# Patient Record
Sex: MALE | Race: WHITE | NOT HISPANIC OR LATINO | Employment: OTHER | URBAN - METROPOLITAN AREA
[De-identification: names, ages, dates, MRNs, and addresses within clinical notes are randomized per-mention and may not be internally consistent; named-entity substitution may affect disease eponyms.]

---

## 2017-06-24 ENCOUNTER — HOSPITAL ENCOUNTER (EMERGENCY)
Facility: MEDICAL CENTER | Age: 57
End: 2017-06-24
Attending: EMERGENCY MEDICINE
Payer: MEDICARE

## 2017-06-24 VITALS
RESPIRATION RATE: 16 BRPM | BODY MASS INDEX: 22.43 KG/M2 | WEIGHT: 190 LBS | TEMPERATURE: 96.6 F | OXYGEN SATURATION: 98 % | DIASTOLIC BLOOD PRESSURE: 72 MMHG | SYSTOLIC BLOOD PRESSURE: 110 MMHG | HEIGHT: 77 IN | HEART RATE: 83 BPM

## 2017-06-24 DIAGNOSIS — M54.42 ACUTE LEFT-SIDED LOW BACK PAIN WITH LEFT-SIDED SCIATICA: ICD-10-CM

## 2017-06-24 PROCEDURE — 96372 THER/PROPH/DIAG INJ SC/IM: CPT

## 2017-06-24 PROCEDURE — 700111 HCHG RX REV CODE 636 W/ 250 OVERRIDE (IP): Performed by: EMERGENCY MEDICINE

## 2017-06-24 PROCEDURE — 99284 EMERGENCY DEPT VISIT MOD MDM: CPT

## 2017-06-24 RX ORDER — CYCLOBENZAPRINE HCL 10 MG
10 TABLET ORAL 3 TIMES DAILY PRN
Qty: 15 TAB | Refills: 1 | Status: SHIPPED | OUTPATIENT
Start: 2017-06-24 | End: 2019-05-24

## 2017-06-24 RX ORDER — KETOROLAC TROMETHAMINE 30 MG/ML
30 INJECTION, SOLUTION INTRAMUSCULAR; INTRAVENOUS ONCE
Status: COMPLETED | OUTPATIENT
Start: 2017-06-24 | End: 2017-06-24

## 2017-06-24 RX ORDER — ONDANSETRON 2 MG/ML
4 INJECTION INTRAMUSCULAR; INTRAVENOUS ONCE
Status: COMPLETED | OUTPATIENT
Start: 2017-06-24 | End: 2017-06-24

## 2017-06-24 RX ORDER — METHYLPREDNISOLONE 4 MG/1
TABLET ORAL
Qty: 1 KIT | Refills: 0 | Status: SHIPPED | OUTPATIENT
Start: 2017-06-24 | End: 2019-05-24

## 2017-06-24 RX ORDER — OXYCODONE AND ACETAMINOPHEN 10; 325 MG/1; MG/1
1 TABLET ORAL EVERY 6 HOURS PRN
Qty: 20 TAB | Refills: 0 | Status: SHIPPED | OUTPATIENT
Start: 2017-06-24 | End: 2019-05-24

## 2017-06-24 RX ADMIN — HYDROMORPHONE HYDROCHLORIDE 1 MG: 1 INJECTION, SOLUTION INTRAMUSCULAR; INTRAVENOUS; SUBCUTANEOUS at 18:42

## 2017-06-24 RX ADMIN — ONDANSETRON 4 MG: 2 INJECTION INTRAMUSCULAR; INTRAVENOUS at 18:42

## 2017-06-24 RX ADMIN — KETOROLAC TROMETHAMINE 30 MG: 30 INJECTION, SOLUTION INTRAMUSCULAR at 18:42

## 2017-06-24 ASSESSMENT — PAIN SCALES - WONG BAKER: WONGBAKER_NUMERICALRESPONSE: HURTS AS MUCH AS POSSIBLE

## 2017-06-24 ASSESSMENT — PAIN SCALES - GENERAL
PAINLEVEL_OUTOF10: 3
PAINLEVEL_OUTOF10: 9

## 2017-06-24 NOTE — ED AVS SNAPSHOT
Home Care Instructions                                                                                                                Jerome Sterling   MRN: [id number]    Department:  [facility], Emergency Dept   Date of Visit:  6/24/2017            [facility], Emergency Dept    [address]    [place] [address]    Phone:  [phone]      You were seen by     Francisco Javier Arzola M.D.      Your Diagnosis Was     Acute left-sided low back pain with left-sided sciatica     M54.42       These are the medications you received during your hospitalization from 06/24/2017 1645 to 06/24/2017 1918     Date/Time Order Dose Route Action    06/24/2017 1842 HYDROmorphone (DILAUDID) injection 1 mg 1 mg Intramuscular Given    06/24/2017 1842 ketorolac (TORADOL) injection 30 mg 30 mg Intramuscular Given    06/24/2017 1842 ondansetron (ZOFRAN) syringe/vial injection 4 mg 4 mg Intramuscular Given      Follow-up Information     1. Schedule an appointment as soon as possible for a visit with Олег Yu M.D..    Specialty:  Neurosurgery    Contact information    [address] [address] [phone]          2. Schedule an appointment as soon as possible for a visit with Familia Zaidi D.O..    Specialty:  Family Medicine    Why:  return if worse or for any concerns    Contact information    [address]   [address]  [facility] [address] [phone]        Medication Information     Review all of your home medications and newly ordered medications with your primary doctor and/or pharmacist as soon as possible. Follow medication instructions as directed by your doctor and/or pharmacist.     Please keep your complete medication list with you and share with your physician. Update the information when medications are discontinued, doses are changed, or new medications (including over-the-counter products) are added; and carry medication information at all times in the  event of emergency situations.               Medication List      START taking these medications        Instructions    Morning Afternoon Evening Bedtime    cyclobenzaprine 10 MG Tabs   Commonly known as:  FLEXERIL        Take 1 Tab by mouth 3 times a day as needed for Muscle Spasms.   Dose:  10 mg                        MethylPREDNISolone 4 MG Tbpk   Commonly known as:  MEDROL DOSEPAK        Take as directed                        oxycodone-acetaminophen  MG Tabs   Commonly known as:  PERCOCET-10        Take 1 Tab by mouth every 6 hours as needed for Moderate Pain or Severe Pain.   Dose:  1 Tab                          ASK your doctor about these medications        Instructions    Morning Afternoon Evening Bedtime    gabapentin 300 MG Caps   Commonly known as:  NEURONTIN        Take 300 mg by mouth every evening.   Dose:  300 mg                        GLUCOSAMINE-CHONDROITIN-MSM PO        Take 2 Tabs by mouth every day.   Dose:  2 Tab                        morphine ER 15 MG Tbcr tablet   Commonly known as:  MS CONTIN        Take 15 mg by mouth every morning.   Dose:  15 mg                        NORCO  MG Tabs   Generic drug:  hydrocodone/acetaminophen        Take 1-2 Tabs by mouth 4 times a day.   Dose:  1-2 Tab                        trazodone 50 MG Tabs   Commonly known as:  DESYREL        Take 50 mg by mouth every evening.   Dose:  50 mg                        WELLBUTRIN  MG Tb12 sustained-release tablet   Generic drug:  buPROPion SR        Take 150 mg by mouth every morning.   Dose:  150 mg                             Where to Get Your Medications      You can get these medications from any pharmacy     Bring a paper prescription for each of these medications    - cyclobenzaprine 10 MG Tabs  - MethylPREDNISolone 4 MG Tbpk  - oxycodone-acetaminophen  MG Tabs              Discharge Instructions       Sciatica  Sciatica is pain, weakness, numbness, or tingling along the path of the  sciatic nerve. The nerve starts in the lower back and runs down the back of each leg. The nerve controls the muscles in the lower leg and in the back of the knee, while also providing sensation to the back of the thigh, lower leg, and the sole of your foot. Sciatica is a symptom of another medical condition. For instance, nerve damage or certain conditions, such as a herniated disk or bone spur on the spine, pinch or put pressure on the sciatic nerve. This causes the pain, weakness, or other sensations normally associated with sciatica. Generally, sciatica only affects one side of the body.  CAUSES   · Herniated or slipped disc.  · Degenerative disk disease.  · A pain disorder involving the narrow muscle in the buttocks (piriformis syndrome).  · Pelvic injury or fracture.  · Pregnancy.  · Tumor (rare).  SYMPTOMS   Symptoms can vary from mild to very severe. The symptoms usually travel from the low back to the buttocks and down the back of the leg. Symptoms can include:  · Mild tingling or dull aches in the lower back, leg, or hip.  · Numbness in the back of the calf or sole of the foot.  · Burning sensations in the lower back, leg, or hip.  · Sharp pains in the lower back, leg, or hip.  · Leg weakness.  · Severe back pain inhibiting movement.  These symptoms may get worse with coughing, sneezing, laughing, or prolonged sitting or standing. Also, being overweight may worsen symptoms.  DIAGNOSIS   Your caregiver will perform a physical exam to look for common symptoms of sciatica. He or she may ask you to do certain movements or activities that would trigger sciatic nerve pain. Other tests may be performed to find the cause of the sciatica. These may include:  · Blood tests.  · X-rays.  · Imaging tests, such as an MRI or CT scan.  TREATMENT   Treatment is directed at the cause of the sciatic pain. Sometimes, treatment is not necessary and the pain and discomfort goes away on its own. If treatment is needed, your  caregiver may suggest:  · Over-the-counter medicines to relieve pain.  · Prescription medicines, such as anti-inflammatory medicine, muscle relaxants, or narcotics.  · Applying heat or ice to the painful area.  · Steroid injections to lessen pain, irritation, and inflammation around the nerve.  · Reducing activity during periods of pain.  · Exercising and stretching to strengthen your abdomen and improve flexibility of your spine. Your caregiver may suggest losing weight if the extra weight makes the back pain worse.  · Physical therapy.  · Surgery to eliminate what is pressing or pinching the nerve, such as a bone spur or part of a herniated disk.  HOME CARE INSTRUCTIONS   · Only take over-the-counter or prescription medicines for pain or discomfort as directed by your caregiver.  · Apply ice to the affected area for 20 minutes, 3-4 times a day for the first 48-72 hours. Then try heat in the same way.  · Exercise, stretch, or perform your usual activities if these do not aggravate your pain.  · Attend physical therapy sessions as directed by your caregiver.  · Keep all follow-up appointments as directed by your caregiver.  · Do not wear high heels or shoes that do not provide proper support.  · Check your mattress to see if it is too soft. A firm mattress may lessen your pain and discomfort.  SEEK IMMEDIATE MEDICAL CARE IF:   · You lose control of your bowel or bladder (incontinence).  · You have increasing weakness in the lower back, pelvis, buttocks, or legs.  · You have redness or swelling of your back.  · You have a burning sensation when you urinate.  · You have pain that gets worse when you lie down or awakens you at night.  · Your pain is worse than you have experienced in the past.  · Your pain is lasting longer than 4 weeks.  · You are suddenly losing weight without reason.  MAKE SURE YOU:  · Understand these instructions.  · Will watch your condition.  · Will get help right away if you are not doing well  or get worse.     This information is not intended to replace advice given to you by your health care provider. Make sure you discuss any questions you have with your health care provider.     Document Released: 12/12/2002 Document Revised: 06/18/2013 Document Reviewed: 04/28/2013  [employer] Interactive Patient Education ©2016 [employer]  Back Pain, Adult  Back pain is very common in adults. The cause of back pain is rarely dangerous and the pain often gets better over time. The cause of your back pain may not be known. Some common causes of back pain include:  · Strain of the muscles or ligaments supporting the spine.  · Wear and tear (degeneration) of the spinal disks.  · Arthritis.  · Direct injury to the back.  For many people, back pain may return. Since back pain is rarely dangerous, most people can learn to manage this condition on their own.  HOME CARE INSTRUCTIONS  Watch your back pain for any changes. The following actions may help to lessen any discomfort you are feeling:  · Remain active. It is stressful on your back to sit or  one place for long periods of time. Do not sit, drive, or  one place for more than 30 minutes at a time. Take short walks on even surfaces as soon as you are able. Try to increase the length of time you walk each day.  · Exercise regularly as directed by your health care provider. Exercise helps your back heal faster. It also helps avoid future injury by keeping your muscles strong and flexible.  · Do not stay in bed. Resting more than 1-2 days can delay your recovery.  · Pay attention to your body when you bend and lift. The most comfortable positions are those that put less stress on your recovering back. Always use proper lifting techniques, including:  ¨ Bending your knees.  ¨ Keeping the load close to your body.  ¨ Avoiding twisting.  · Find a comfortable position to sleep. Use a firm mattress and lie on your side with your knees slightly bent. If you lie on  your back, put a pillow under your knees.  · Avoid feeling anxious or stressed. Stress increases muscle tension and can worsen back pain. It is important to recognize when you are anxious or stressed and learn ways to manage it, such as with exercise.  · Take medicines only as directed by your health care provider. Over-the-counter medicines to reduce pain and inflammation are often the most helpful. Your health care provider may prescribe muscle relaxant drugs. These medicines help dull your pain so you can more quickly return to your normal activities and healthy exercise.  · Apply ice to the injured area:  ¨ Put ice in a plastic bag.  ¨ Place a towel between your skin and the bag.  ¨ Leave the ice on for 20 minutes, 2-3 times a day for the first 2-3 days. After that, ice and heat may be alternated to reduce pain and spasms.  · Maintain a healthy weight. Excess weight puts extra stress on your back and makes it difficult to maintain good posture.  SEEK MEDICAL CARE IF:  · You have pain that is not relieved with rest or medicine.  · You have increasing pain going down into the legs or buttocks.  · You have pain that does not improve in one week.  · You have night pain.  · You lose weight.  · You have a fever or chills.  SEEK IMMEDIATE MEDICAL CARE IF:   · You develop new bowel or bladder control problems.  · You have unusual weakness or numbness in your arms or legs.  · You develop nausea or vomiting.  · You develop abdominal pain.  · You feel faint.     This information is not intended to replace advice given to you by your health care provider. Make sure you discuss any questions you have with your health care provider.     Document Released: 12/18/2006 Document Revised: 01/08/2016 Document Reviewed: 04/21/2015  [employer] Interactive Patient Education ©2016 [employer]            Patient Information     Patient Information    Following emergency treatment: all patient requiring follow-up care must return either  to a private physician or a clinic if your condition worsens before you are able to obtain further medical attention, please return to the emergency room.     Billing Information    At [facility], we work to make the billing process streamlined for our patients.  Our Representatives are here to answer any questions you may have regarding your hospital bill.  If you have insurance coverage and have supplied your insurance information to us, we will submit a claim to your insurer on your behalf.  Should you have any questions regarding your bill, we can be reached online or by phone as follows:  Online: You are able pay your bills online or live chat with our representatives about any billing questions you may have. We are here to help Monday - Friday from 8:00am to 7:30pm and 9:00am - 12:00pm on Saturdays.  Please visit [email or web address]  for more information.   Phone:  [phone] or [phone]    Please note that your emergency physician, surgeon, pathologist, radiologist, anesthesiologist, and other specialists are not employed by [facility] and will therefore bill separately for their services.  Please contact them directly for any questions concerning their bills at the numbers below:     Emergency Physician Services:  [phone]  [facility]:  [phone]  [facility]:  [phone]  [facility]:  [phone]    1. Your final bill may vary from the amount quoted upon discharge if all procedures are not complete at that time, or if your doctor has additional procedures of which we are not aware. You will receive an additional bill if you return to the Emergency Department at [facility] for suture removal regardless of the facility of which the sutures were placed.     2. Please arrange for settlement of this account at the emergency registration.    3. All self-pay accounts are due in full at the time of treatment.   If you are unable to meet this obligation then payment is expected within 4-5 days.     4. If you have had radiology studies (CT, X-ray, Ultrasound, MRI), you have received a preliminary result during your emergency department visit. Please contact the radiology department [phone] to receive a copy of your final result. Please discuss the Final result with your primary physician or with the follow up physician provided.     Crisis Hotline:  [place] Crisis Hotline:  2-368-JXWGOHJ or 1-395.546.8897  [place] Crisis Hotline:    1-744.917.2001 or 851-358-5790         ED Discharge Follow Up Questions    1. In order to provide you with very good care, we would like to follow up with a phone call in the next few days.  May we have your permission to contact you?     YES /  NO    2. What is the best phone number to call you? (       )_____-__________    3. What is the best time to call you?      Morning  /  Afternoon  /  Evening                   Patient Signature:  ____________________________________________________________    Date:  ____________________________________________________________

## 2017-06-24 NOTE — ED AVS SNAPSHOT
Advaction Access Code: Activation code not generated  Current Advaction Status: Active    [employer]  A secure, online tool to manage your health information     [employer]’s Advaction® is a secure, online tool that connects you to your personalized health information from the privacy of your home -- day or night - making it very easy for you to manage your healthcare. Once the activation process is completed, you can even access your medical information using the Advaction irene, which is available for free in the Apple Irene store or Google Play store.     Advaction provides the following levels of access (as shown below):   My Chart Features   [facility] Primary Care Doctor [facility]  Specialists [facility]  Urgent  Care Non-[facility]  Primary Care  Doctor   Email your healthcare team securely and privately 24/7 X X X X   Manage appointments: schedule your next appointment; view details of past/upcoming appointments X      Request prescription refills. X      View recent personal medical records, including lab and immunizations X X X X   View health record, including health history, allergies, medications X X X X   Read reports about your outpatient visits, procedures, consult and ER notes X X X X   See your discharge summary, which is a recap of your hospital and/or ER visit that includes your diagnosis, lab results, and care plan. X X       How to register for Advaction:  1. Go to  [email or web address].  2. Click on the Sign Up Now box, which takes you to the New Member Sign Up page. You will need to provide the following information:  a. Enter your Advaction Access Code exactly as it appears at the top of this page. (You will not need to use this code after you’ve completed the sign-up process. If you do not sign up before the expiration date, you must request a new code.)   b. Enter your date of birth.   c. Enter your home email address.   d. Click Submit, and follow the next screen’s instructions.  3. Create a Advaction ID. This will  be your [employer] login ID and cannot be changed, so think of one that is secure and easy to remember.  4. Create a Com[employer] Serve password. You can change your password at any time.  5. Enter your Password Reset Question and Answer. This can be used at a later time if you forget your password.   6. Enter your e-mail address. This allows you to receive e-mail notifications when new information is available in Comply Serve.  7. Click Sign Up. You can now view your health information.    For assistance activating your Comply Serve account, call [phone]

## 2017-06-24 NOTE — ED AVS SNAPSHOT
6/24/2017    Jerome Crews [place]  [address] [place] [address]    Dear Jerome:    [facility] wants to ensure your discharge home is safe and you or your loved ones have had all of your questions answered regarding your care after you leave the hospital.    Below is a list of resources and contact information should you have any questions regarding your hospital stay, follow-up instructions, or active medical symptoms.    Questions or Concerns Regarding… Contact   Medical Questions Related to Your Discharge  (7 days a week, 8am-5pm) Contact a Nurse Care Coordinator   [phone]   Medical Questions Not Related to Your Discharge  (24 hours a day / 7 days a week)  Contact the Nurse Health Line   [phone]    Medications or Discharge Instructions Refer to your discharge packet   or contact your [facility] Primary Care Provider   [phone]   Follow-up Appointment(s) Schedule your appointment via Payment plugin   or contact Scheduling [phone]   Billing Review your statement via Payment plugin  or contact Billing [phone]   Medical Records Review your records via Payment plugin   or contact Medical Records [phone]     You may receive a telephone call within two days of discharge. This call is to make certain you understand your discharge instructions and have the opportunity to have any questions answered. You can also easily access your medical information, test results and upcoming appointments via the Payment plugin free online health management tool. You can learn more and sign up at [employer]/Payment plugin. For assistance setting up your Payment plugin account, please call [phone].    Once again, we want to ensure your discharge home is safe and that you have a clear understanding of any next steps in your care. If you have any questions or concerns, please do not hesitate to contact us, we are here for you. Thank you for choosing [facility] for your healthcare needs.    Sincerely,    Your [facility] Healthcare Team

## 2017-06-25 NOTE — ED NOTES
"Pt bib ems c/o back pain after walking and hearing \"a pop\" now with low back pain and pain radiating down left leg. Pt has hx of back surgeries.   "

## 2017-06-25 NOTE — ED NOTES
"Pt very upset that he was asked to get out of wheelchair and onto gurney, pt states \"my lower back hurts and it's more comfortable sitting right where I am\", explained that doctor cannot exam pt while he is in the wheelchair, pt states \"I know what's wrong with me and I can tell him exactly where it popped out of place\", explained to pt that the doctor will not see pt until he is on gurney, pt grumbling stating \"fine, but it's gonna take awhile before I can get up there, if ya just give me some pain medication it would make it a lot easier on me\", explained to pt that only the doctor can order medication.  "

## 2017-06-25 NOTE — ED PROVIDER NOTES
ED Provider Note    CHIEF COMPLAINT  Chief Complaint   Patient presents with   • Low Back Pain       HPI  Jerome Sterling is a 56 y.o. male who presents with low back pain, left paraspinal region radiating down the left leg.  Patient had previous surgery in 2014, is planning to follow up with Dr. espinal.  I walking today, he felt his right ankle beginning to twist, states he jumped back to his left leg to prevent his right leg from being sprained feeling a pop in his back.  There was no fall or direct blow to his back.  Pain has been constant, cramping, at times sharp radiating down his left leg.  He has had previous sciatica and states this feels similar.The patient denies bowel or bladder incontinence.  The patient denies urinary retension or saddle paresthesias.  There is no abdominal pain or fever.  There is no history of injected street drugs or recent surgery.  No acute numbness or weakness    REVIEW OF SYSTEMS    Constitutional: No fever  Respiratory: No shortness of breath  Cardiac: No chest pain or syncope  Gastrointestinal: No abdominal pain  Musculoskeletal: Low back pain  Neurologic: Left leg sciatica  Skin: no shingles           PAST MEDICAL HISTORY  Past Medical History   Diagnosis Date   • Pericardial cyst 9/25/2012   • Hypercholesterolemia 9/25/2012   • Snoring    • Jaundice 1979   • Unspecified cataract      early stages   • Arthritis      back and neck   • Psychiatric problem      depression  not on meds   • Hepatitis A      1979   • Dental disorder      denture/partial   • Sleep apnea      CPAP   • Heart burn    • Pain      back and neck       FAMILY HISTORY  Family History   Problem Relation Age of Onset   • Heart Disease Mother    • Other Mother        SOCIAL HISTORY  Social History     Social History   • Marital Status:      Spouse Name: N/A   • Number of Children: N/A   • Years of Education: N/A     Social History Main Topics   • Smoking status: Current Every Day Smoker -- 2.00  "packs/day for 40 years     Types: Cigars   • Smokeless tobacco: Former User     Types: Chew   • Alcohol Use: 3.0 oz/week     6 Cans of beer per week      Comment: 6 pack a wk   • Drug Use: No      Comment: former, none since 2005   • Sexual Activity: Not on file     Other Topics Concern   • Not on file     Social History Narrative   • No narrative on file       SURGICAL HISTORY  Past Surgical History   Procedure Laterality Date   • Lumbar laminectomy diskectomy     • Appendectomy     • Other orthopedic surgery  1990     left forearm ligaments   • Cervical disk and fusion anterior  6/30/2014     Performed by Олег Yu M.D. at SUR[facility]ERY [facility]   • Lumbar fusion anterior  10/14/2014     Performed by Олег Yu M.D. at SUR[facility]ERY [facility]   • Lumbar laminectomy diskectomy  10/14/2014     Performed by Олег Yu M.D. at SUR[facility]ERY [facility]       CURRENT MEDICATIONS  Home Medications     **Home medications have not yet been reviewed for this encounter**          ALLERGIES  No Known Allergies    PHYSICAL EXAM  VITAL SIGNS: /78 mmHg  Pulse 88  Temp(Src) 35.9 °C (96.6 °F) (Temporal)  Resp 18  Ht 1.956 m (6' 5\")  Wt 86.183 kg (190 lb)  BMI 22.53 kg/m2  SpO2 98%  Constitutional:  No acute distress, Non-toxic appearance.   Skin: Warm, No shingles.   Musculoskeletal: Tenderness lumbar left paraspinal muscle tone.  No midline tenderness.  No left leg tenderness  Vascular: Normal capillary refill in the lower extremity  Neurologic: Sensation intact both feet.  Strength intact with exception of left leg slight weakness secondary to pain with movement.       COURSE & MEDICAL DECISION MAKING  Pertinent Labs & Imaging studies reviewed. (See chart for details)  Patient felt somewhat improved after Dilaudid injection, was able to stand and hold his own waste.  Review of  database shows no pattern of narcotic abuse, showing no prescriptions over the past 2 years.  Patient placed on Medrol " Dosepak, Flexeril for muscle spasm, Percocet for pain.  Patient states he will be following up with his neurosurgeon Dr. espinal as soon as possible.  He has agreed to return if worse or for any concerns.  There is no evidence of cauda equina syndrome on exam or by history.    FINAL IMPRESSION  1. Acute left-sided low back pain with left-sided sciatica            Electronically signed by: Francisco Javier Arzola, 6/24/2017 6:34 PM

## 2017-06-25 NOTE — DISCHARGE INSTRUCTIONS
Sciatica  Sciatica is pain, weakness, numbness, or tingling along the path of the sciatic nerve. The nerve starts in the lower back and runs down the back of each leg. The nerve controls the muscles in the lower leg and in the back of the knee, while also providing sensation to the back of the thigh, lower leg, and the sole of your foot. Sciatica is a symptom of another medical condition. For instance, nerve damage or certain conditions, such as a herniated disk or bone spur on the spine, pinch or put pressure on the sciatic nerve. This causes the pain, weakness, or other sensations normally associated with sciatica. Generally, sciatica only affects one side of the body.  CAUSES   · Herniated or slipped disc.  · Degenerative disk disease.  · A pain disorder involving the narrow muscle in the buttocks (piriformis syndrome).  · Pelvic injury or fracture.  · Pregnancy.  · Tumor (rare).  SYMPTOMS   Symptoms can vary from mild to very severe. The symptoms usually travel from the low back to the buttocks and down the back of the leg. Symptoms can include:  · Mild tingling or dull aches in the lower back, leg, or hip.  · Numbness in the back of the calf or sole of the foot.  · Burning sensations in the lower back, leg, or hip.  · Sharp pains in the lower back, leg, or hip.  · Leg weakness.  · Severe back pain inhibiting movement.  These symptoms may get worse with coughing, sneezing, laughing, or prolonged sitting or standing. Also, being overweight may worsen symptoms.  DIAGNOSIS   Your caregiver will perform a physical exam to look for common symptoms of sciatica. He or she may ask you to do certain movements or activities that would trigger sciatic nerve pain. Other tests may be performed to find the cause of the sciatica. These may include:  · Blood tests.  · X-rays.  · Imaging tests, such as an MRI or CT scan.  TREATMENT   Treatment is directed at the cause of the sciatic pain. Sometimes, treatment is not necessary  and the pain and discomfort goes away on its own. If treatment is needed, your caregiver may suggest:  · Over-the-counter medicines to relieve pain.  · Prescription medicines, such as anti-inflammatory medicine, muscle relaxants, or narcotics.  · Applying heat or ice to the painful area.  · Steroid injections to lessen pain, irritation, and inflammation around the nerve.  · Reducing activity during periods of pain.  · Exercising and stretching to strengthen your abdomen and improve flexibility of your spine. Your caregiver may suggest losing weight if the extra weight makes the back pain worse.  · Physical therapy.  · Surgery to eliminate what is pressing or pinching the nerve, such as a bone spur or part of a herniated disk.  HOME CARE INSTRUCTIONS   · Only take over-the-counter or prescription medicines for pain or discomfort as directed by your caregiver.  · Apply ice to the affected area for 20 minutes, 3-4 times a day for the first 48-72 hours. Then try heat in the same way.  · Exercise, stretch, or perform your usual activities if these do not aggravate your pain.  · Attend physical therapy sessions as directed by your caregiver.  · Keep all follow-up appointments as directed by your caregiver.  · Do not wear high heels or shoes that do not provide proper support.  · Check your mattress to see if it is too soft. A firm mattress may lessen your pain and discomfort.  SEEK IMMEDIATE MEDICAL CARE IF:   · You lose control of your bowel or bladder (incontinence).  · You have increasing weakness in the lower back, pelvis, buttocks, or legs.  · You have redness or swelling of your back.  · You have a burning sensation when you urinate.  · You have pain that gets worse when you lie down or awakens you at night.  · Your pain is worse than you have experienced in the past.  · Your pain is lasting longer than 4 weeks.  · You are suddenly losing weight without reason.  MAKE SURE YOU:  · Understand these  instructions.  · Will watch your condition.  · Will get help right away if you are not doing well or get worse.     This information is not intended to replace advice given to you by your health care provider. Make sure you discuss any questions you have with your health care provider.     Document Released: 12/12/2002 Document Revised: 06/18/2013 Document Reviewed: 04/28/2013  [employer] Interactive Patient Education ©2016 [employer]  Back Pain, Adult  Back pain is very common in adults. The cause of back pain is rarely dangerous and the pain often gets better over time. The cause of your back pain may not be known. Some common causes of back pain include:  · Strain of the muscles or ligaments supporting the spine.  · Wear and tear (degeneration) of the spinal disks.  · Arthritis.  · Direct injury to the back.  For many people, back pain may return. Since back pain is rarely dangerous, most people can learn to manage this condition on their own.  HOME CARE INSTRUCTIONS  Watch your back pain for any changes. The following actions may help to lessen any discomfort you are feeling:  · Remain active. It is stressful on your back to sit or  one place for long periods of time. Do not sit, drive, or  one place for more than 30 minutes at a time. Take short walks on even surfaces as soon as you are able. Try to increase the length of time you walk each day.  · Exercise regularly as directed by your health care provider. Exercise helps your back heal faster. It also helps avoid future injury by keeping your muscles strong and flexible.  · Do not stay in bed. Resting more than 1-2 days can delay your recovery.  · Pay attention to your body when you bend and lift. The most comfortable positions are those that put less stress on your recovering back. Always use proper lifting techniques, including:  ¨ Bending your knees.  ¨ Keeping the load close to your body.  ¨ Avoiding twisting.  · Find a comfortable  position to sleep. Use a firm mattress and lie on your side with your knees slightly bent. If you lie on your back, put a pillow under your knees.  · Avoid feeling anxious or stressed. Stress increases muscle tension and can worsen back pain. It is important to recognize when you are anxious or stressed and learn ways to manage it, such as with exercise.  · Take medicines only as directed by your health care provider. Over-the-counter medicines to reduce pain and inflammation are often the most helpful. Your health care provider may prescribe muscle relaxant drugs. These medicines help dull your pain so you can more quickly return to your normal activities and healthy exercise.  · Apply ice to the injured area:  ¨ Put ice in a plastic bag.  ¨ Place a towel between your skin and the bag.  ¨ Leave the ice on for 20 minutes, 2-3 times a day for the first 2-3 days. After that, ice and heat may be alternated to reduce pain and spasms.  · Maintain a healthy weight. Excess weight puts extra stress on your back and makes it difficult to maintain good posture.  SEEK MEDICAL CARE IF:  · You have pain that is not relieved with rest or medicine.  · You have increasing pain going down into the legs or buttocks.  · You have pain that does not improve in one week.  · You have night pain.  · You lose weight.  · You have a fever or chills.  SEEK IMMEDIATE MEDICAL CARE IF:   · You develop new bowel or bladder control problems.  · You have unusual weakness or numbness in your arms or legs.  · You develop nausea or vomiting.  · You develop abdominal pain.  · You feel faint.     This information is not intended to replace advice given to you by your health care provider. Make sure you discuss any questions you have with your health care provider.     Document Released: 12/18/2006 Document Revised: 01/08/2016 Document Reviewed: 04/21/2015  [employer] Interactive Patient Education ©2016 [employer]

## 2019-04-09 ENCOUNTER — TELEPHONE (OUTPATIENT)
Dept: SCHEDULING | Facility: IMAGING CENTER | Age: 59
End: 2019-04-09

## 2019-05-23 ENCOUNTER — TELEPHONE (OUTPATIENT)
Dept: MEDICAL GROUP | Facility: MEDICAL CENTER | Age: 59
End: 2019-05-23

## 2019-05-23 NOTE — TELEPHONE ENCOUNTER
NEW PATIENT VISIT PRE-VISIT PLANNING    1.  EpicCare Patient is checked in Patient Demographics? YES    2.  Immunizations were updated in Epic using WebIZ?: Epic matches WebIZ       •  Web Iz Recommendations: FLU, MMR , TDAP, VARICELLA (Chicken Pox)  and SHINGRIX (Shingles)    3.  Is this appointment scheduled as a Hospital Follow-Up? No    4.  Patient is due for the following Health Maintenance Topics:   Health Maintenance Due   Topic Date Due   • Annual Wellness Visit  1960   • IMM DTaP/Tdap/Td Vaccine (1 - Tdap) 12/27/1977   • COLONOSCOPY  12/27/2008   • IMM ZOSTER VACCINES (1 of 2) 12/27/2008     5. Orders for overdue Health Maintenance topics pended in Pre-Charting? NO    6.  Reviewed/Updated the following with patient:   •   Preferred Pharmacy? NO       •   Preferred Lab? NO       •   Preferred Communication? NO       •   Allergies? NO       •   Medications? NO       •   Social History? NO       •   Family History (document living status of immediate family members and if + hx of cancer, diabetes, hypertension, hyperlipidemia, heart attack, stroke) NO    7.  Updated Care Team?       •   DME Company (gait device, O2, CPAP, etc.) NO       •   Other Specialists (eye doctor, derm, GYN, cardiology, endo, etc): NO    8.  Patient was informed to arrive 15 min prior to their   scheduled appointment and bring in their medication bottles.

## 2019-05-24 ENCOUNTER — OFFICE VISIT (OUTPATIENT)
Dept: MEDICAL GROUP | Facility: MEDICAL CENTER | Age: 59
End: 2019-05-24
Payer: MEDICARE

## 2019-05-24 ENCOUNTER — HOSPITAL ENCOUNTER (OUTPATIENT)
Dept: LAB | Facility: MEDICAL CENTER | Age: 59
End: 2019-05-24
Attending: PHYSICIAN ASSISTANT
Payer: MEDICARE

## 2019-05-24 VITALS
BODY MASS INDEX: 24.99 KG/M2 | TEMPERATURE: 98.2 F | WEIGHT: 211.64 LBS | SYSTOLIC BLOOD PRESSURE: 110 MMHG | HEIGHT: 77 IN | OXYGEN SATURATION: 95 % | DIASTOLIC BLOOD PRESSURE: 74 MMHG | HEART RATE: 64 BPM | RESPIRATION RATE: 16 BRPM

## 2019-05-24 DIAGNOSIS — M79.671 PAIN IN BOTH FEET: ICD-10-CM

## 2019-05-24 DIAGNOSIS — F17.200 TOBACCO DEPENDENCE: ICD-10-CM

## 2019-05-24 DIAGNOSIS — R19.7 DIARRHEA, UNSPECIFIED TYPE: ICD-10-CM

## 2019-05-24 DIAGNOSIS — E78.00 HYPERCHOLESTEREMIA: ICD-10-CM

## 2019-05-24 DIAGNOSIS — M79.672 PAIN IN BOTH FEET: ICD-10-CM

## 2019-05-24 DIAGNOSIS — K62.5 RECTAL BLEEDING: ICD-10-CM

## 2019-05-24 DIAGNOSIS — Z76.89 ENCOUNTER TO ESTABLISH CARE: ICD-10-CM

## 2019-05-24 DIAGNOSIS — Z00.00 PREVENTATIVE HEALTH CARE: ICD-10-CM

## 2019-05-24 DIAGNOSIS — G89.29 CHRONIC BILATERAL LOW BACK PAIN WITH BILATERAL SCIATICA: ICD-10-CM

## 2019-05-24 DIAGNOSIS — R73.03 PREDIABETES: ICD-10-CM

## 2019-05-24 DIAGNOSIS — Z12.5 SCREENING PSA (PROSTATE SPECIFIC ANTIGEN): ICD-10-CM

## 2019-05-24 DIAGNOSIS — G47.30 SLEEP APNEA, UNSPECIFIED TYPE: ICD-10-CM

## 2019-05-24 DIAGNOSIS — M54.42 CHRONIC BILATERAL LOW BACK PAIN WITH BILATERAL SCIATICA: ICD-10-CM

## 2019-05-24 DIAGNOSIS — M54.41 CHRONIC BILATERAL LOW BACK PAIN WITH BILATERAL SCIATICA: ICD-10-CM

## 2019-05-24 LAB
ALBUMIN SERPL BCP-MCNC: 4.4 G/DL (ref 3.2–4.9)
ALBUMIN/GLOB SERPL: 1.6 G/DL
ALP SERPL-CCNC: 94 U/L (ref 30–99)
ALT SERPL-CCNC: 25 U/L (ref 2–50)
ANION GAP SERPL CALC-SCNC: 3 MMOL/L (ref 0–11.9)
AST SERPL-CCNC: 24 U/L (ref 12–45)
BASOPHILS # BLD AUTO: 0.6 % (ref 0–1.8)
BASOPHILS # BLD: 0.03 K/UL (ref 0–0.12)
BILIRUB SERPL-MCNC: 0.6 MG/DL (ref 0.1–1.5)
BUN SERPL-MCNC: 17 MG/DL (ref 8–22)
CALCIUM SERPL-MCNC: 9.1 MG/DL (ref 8.5–10.5)
CHLORIDE SERPL-SCNC: 108 MMOL/L (ref 96–112)
CHOLEST SERPL-MCNC: 177 MG/DL (ref 100–199)
CO2 SERPL-SCNC: 26 MMOL/L (ref 20–33)
CREAT SERPL-MCNC: 1.04 MG/DL (ref 0.5–1.4)
EOSINOPHIL # BLD AUTO: 0.12 K/UL (ref 0–0.51)
EOSINOPHIL NFR BLD: 2.6 % (ref 0–6.9)
ERYTHROCYTE [DISTWIDTH] IN BLOOD BY AUTOMATED COUNT: 45.2 FL (ref 35.9–50)
EST. AVERAGE GLUCOSE BLD GHB EST-MCNC: 105 MG/DL
FASTING STATUS PATIENT QL REPORTED: NORMAL
GLOBULIN SER CALC-MCNC: 2.7 G/DL (ref 1.9–3.5)
GLUCOSE SERPL-MCNC: 112 MG/DL (ref 65–99)
HBA1C MFR BLD: 5.3 % (ref 0–5.6)
HCT VFR BLD AUTO: 46.7 % (ref 42–52)
HDLC SERPL-MCNC: 52 MG/DL
HGB BLD-MCNC: 15.7 G/DL (ref 14–18)
IMM GRANULOCYTES # BLD AUTO: 0.01 K/UL (ref 0–0.11)
IMM GRANULOCYTES NFR BLD AUTO: 0.2 % (ref 0–0.9)
LDLC SERPL CALC-MCNC: 93 MG/DL
LYMPHOCYTES # BLD AUTO: 1.58 K/UL (ref 1–4.8)
LYMPHOCYTES NFR BLD: 33.7 % (ref 22–41)
MCH RBC QN AUTO: 31.5 PG (ref 27–33)
MCHC RBC AUTO-ENTMCNC: 33.6 G/DL (ref 33.7–35.3)
MCV RBC AUTO: 93.8 FL (ref 81.4–97.8)
MONOCYTES # BLD AUTO: 0.41 K/UL (ref 0–0.85)
MONOCYTES NFR BLD AUTO: 8.7 % (ref 0–13.4)
NEUTROPHILS # BLD AUTO: 2.54 K/UL (ref 1.82–7.42)
NEUTROPHILS NFR BLD: 54.2 % (ref 44–72)
NRBC # BLD AUTO: 0 K/UL
NRBC BLD-RTO: 0 /100 WBC
PLATELET # BLD AUTO: 193 K/UL (ref 164–446)
PMV BLD AUTO: 9.8 FL (ref 9–12.9)
POTASSIUM SERPL-SCNC: 4.5 MMOL/L (ref 3.6–5.5)
PROT SERPL-MCNC: 7.1 G/DL (ref 6–8.2)
PSA SERPL-MCNC: 0.54 NG/ML (ref 0–4)
RBC # BLD AUTO: 4.98 M/UL (ref 4.7–6.1)
SODIUM SERPL-SCNC: 137 MMOL/L (ref 135–145)
TRIGL SERPL-MCNC: 159 MG/DL (ref 0–149)
URATE SERPL-MCNC: 8.2 MG/DL (ref 2.5–8.3)
WBC # BLD AUTO: 4.7 K/UL (ref 4.8–10.8)

## 2019-05-24 PROCEDURE — 80061 LIPID PANEL: CPT

## 2019-05-24 PROCEDURE — 80053 COMPREHEN METABOLIC PANEL: CPT

## 2019-05-24 PROCEDURE — 84153 ASSAY OF PSA TOTAL: CPT

## 2019-05-24 PROCEDURE — 83036 HEMOGLOBIN GLYCOSYLATED A1C: CPT

## 2019-05-24 PROCEDURE — 84550 ASSAY OF BLOOD/URIC ACID: CPT

## 2019-05-24 PROCEDURE — 99204 OFFICE O/P NEW MOD 45 MIN: CPT | Performed by: PHYSICIAN ASSISTANT

## 2019-05-24 PROCEDURE — 36415 COLL VENOUS BLD VENIPUNCTURE: CPT

## 2019-05-24 PROCEDURE — 85025 COMPLETE CBC W/AUTO DIFF WBC: CPT

## 2019-05-24 ASSESSMENT — PATIENT HEALTH QUESTIONNAIRE - PHQ9: CLINICAL INTERPRETATION OF PHQ2 SCORE: 0

## 2019-05-24 NOTE — PROGRESS NOTES
Subjective:   Jerome Sterling is a 58 y.o. male here today for establishing care, chronic diarrhea, pain in bilateral feet, chronic low back pain with bilateral sciatica and sleep apnea.    Diarrhea  This is a 58-year-old male accompanied by his wife, Purvi.  Complains of a 6-month history approximately of diarrhea.  4 loose watery stools a day.  Has had a couple episodes where he has had bright red blood per rectum.  One time happened last week and the whole bowl was filled with blood.  He states he had a colonoscopy either when he was 48 or 53.  No polyps found.  No previous history of any family concerns with colon cancer.  At least his first-degree relatives.  Denies any weight loss and in fact has been gaining weight.  He does consume about 3 drinks a day.  Light beer.  States that his alcohol consumption has improved over time.  He denies any abdominal pain or rectal spasming but does have some rectal burning.    Pain in both feet  Complains of pain in both of his feet.  Burning pain of his toes and foot.  Was told in the past that he had prediabetes.  Pain shoots up his feet.  He states that he has been taking allopurinol at times when the pain is worse and that is effective.  No history of any known gout.  Does consume about 3 drinks a day but in the past was much more.  His wife has gout.    Chronic bilateral low back pain with bilateral sciatica  Chronic condition.  Disabled after a work accident.  Was many years ago.  Has had a couple surgeries performed by Dr. espinal in the past.  Currently is just living with the pain.  Manages it with marijuana use.  States that the pain is constant.    Sleep apnea  History of sleep apnea.  In the past had a CPAP machine.  Did not like to wear the CPAP machine because it would wake him up and he would have a hard time falling back asleep.  Wife states that he slept better.  Continues to have sleep apnea symptoms.       Current medicines (including changes  "today)  No current outpatient prescriptions on file.     No current facility-administered medications for this visit.      He  has a past medical history of Arthritis; Dental disorder; Heart burn; Hepatitis A; Hypercholesterolemia (9/25/2012); Jaundice (1979); Pain; Pericardial cyst (9/25/2012); Psychiatric problem; Sleep apnea; Snoring; and Unspecified cataract.    Social History and Family History were reviewed and updated.    ROS   No chest pain, no shortness of breath, no abdominal pain and all other systems were reviewed and are negative.       Objective:     /74 (BP Location: Left arm, Patient Position: Sitting, BP Cuff Size: Adult)   Pulse 64   Temp 36.8 °C (98.2 °F) (Temporal)   Resp 16   Ht 1.956 m (6' 5\")   Wt 96 kg (211 lb 10.3 oz)   SpO2 95%  Body mass index is 25.1 kg/m².   Physical Exam:  Constitutional: Alert, no distress.  Skin: Warm, dry, good turgor, no rashes in visible areas.  Eye: Equal, round and reactive, conjunctiva clear, lids normal.  ENMT: Lips without lesions, good dentition, oropharynx clear.  Neck: Trachea midline, no masses.   Lymph: No cervical or supraclavicular lymphadenopathy  Respiratory: Unlabored respiratory effort, lungs clear to auscultation, no wheezes, no ronchi.  Cardiovascular: Normal S1, S2, no murmur, no edema.  Psych: Alert and oriented x3, normal affect and mood.        Assessment and Plan:   The following treatment plan was discussed    1. Diarrhea, unspecified type  Chronic condition.  Referred to GI for evaluation.  Will need a colonoscopy.  Ordered labs.  - REFERRAL TO GASTROENTEROLOGY  - CBC WITH DIFFERENTIAL; Future    2. Rectal bleeding  Chronic condition likely secondary to hemorrhoids.  Also concerned about colitis.  Referred to GI for evaluation and treatment.  - REFERRAL TO GASTROENTEROLOGY  - CBC WITH DIFFERENTIAL; Future  - Comp Metabolic Panel; Future    3. Pain in both feet  Chronic condition.  Unknown etiology.  Could be secondary to " sciatica.  Ordered uric acid level.  Will follow in future.  - URIC ACID; Future    4. Chronic bilateral low back pain with bilateral sciatica  Chronic condition.  At next appointment may need to refer to Dr. espinal for evaluation.  Will await labs to check for diabetes as well as a uric acid level.    5. Sleep apnea, unspecified type  Chronic condition.  Uncontrolled.  Referred to sleep studies for evaluation and treatment.  - REFERRAL TO SLEEP STUDIES    6. Tobacco dependence  Chronic use.  We will continue to monitor.  Wishes to stop smoking and will try to do it on his own.    7. Screening PSA (prostate specific antigen)  Ordered PSA.  - PROSTATE SPECIFIC AG SCREENING; Future    8.  Hypercholesterolemia   Chronic history. Status unknown. Ordered labs fasting.  He will be contacted with the results.  - Lipid Profile; Future    9.  Prediabetes  Chronic history.  Status unknown.  A1c ordered.  - HEMOGLOBIN A1C; Future    10. Encounter to establish care      Followup: Return in about 3 months (around 8/24/2019), or if symptoms worsen or fail to improve.    Please note that this dictation was created using voice recognition software. I have made every reasonable attempt to correct obvious errors, but I expect that there are errors of grammar and possibly content that I did not discover before finalizing the note.

## 2019-05-24 NOTE — ASSESSMENT & PLAN NOTE
Chronic condition.  Disabled after a work accident.  Was many years ago.  Has had a couple surgeries performed by Dr. espinal in the past.  Currently is just living with the pain.  Manages it with marijuana use.  States that the pain is constant.

## 2019-05-24 NOTE — ASSESSMENT & PLAN NOTE
History of sleep apnea.  In the past had a CPAP machine.  Did not like to wear the CPAP machine because it would wake him up and he would have a hard time falling back asleep.  Wife states that he slept better.  Continues to have sleep apnea symptoms.

## 2019-05-27 DIAGNOSIS — R79.89 ABNORMAL CBC: ICD-10-CM

## 2019-08-14 ENCOUNTER — TELEPHONE (OUTPATIENT)
Dept: MEDICAL GROUP | Facility: PHYSICIAN GROUP | Age: 59
End: 2019-08-14

## 2019-08-14 NOTE — TELEPHONE ENCOUNTER
Colorectal Care Gap Outreach    1. Confirmed patient is between the ages of 50-75: YES     2. Confirmed that patient IS overdue or due soon for colorectal cancer screening: NO     3. Were orders placed within the last 12 months to complete screening: NO     Phone Number Called:[phone]  Call outcome: Patient has completed Colonoscopy and HMR Letter faxed to: [facility]   Address   City, State, Zip:               [address] [place] [address]   Phone:  [phone]      Fax:      [phone]         _____________________________________________________________________    Colon Cancer Screening Guidelines:     Important: If patient has any history of colon polyps or family history of colorectal cancer, FIT and Cologuard are NOT appropriate options. A colonoscopy is the recommended test for this set of patients.    • Colonoscopy  o Always recommend colonoscopy first.   o A colonoscopy is recommended over the other tests because it provides direct visualization of the colon and if there are small polyps these can also be removed with one procedure.  o If negative and no family history, could be cleared for 10 years.     • Cologuard/FIT  o Cologuard is completed once every 3 years.  o FIT is completed annually.  o If positive, Cologuard and FIT will require a diagnostic colonoscopy. Screening colonoscopies are classically covered by insurances, however, diagnostic colonoscopies may result in a bill.

## 2019-08-14 NOTE — LETTER
[facility]  Ambrose Rodriguez P.A.-C.  [address]  [place] [address]  Fax: [phone]   Authorization for Release/Disclosure of   Protected Health Information   Name: JEROME STERLING : 1960 SSN: [id number]   Address: [address] [place] [address] Phone:    [phone] (home)    I authorize the entity listed below to release/disclose the PHI below to:   [facility]/Ambrose Rodriguez P.A.-C. and Ambrose Rodriguez P.A.-C.   Provider or Entity Name: [facility]      Address   City, State, Zip   Phone:      Fax:[phone]     Reason for request: continuity of care   Information to be released:    [X] LAST COLONOSCOPY,  including any PATH REPORT and follow-up  [  ] LAST FIT/COLOGUARD RESULT [  ] LAST DEXA  [  ] LAST MAMMOGRAM  [  ] LAST PAP  [  ] LAST LABS [  ] RETINA EXAM REPORT  [  ] IMMUNIZATION RECORDS  [  ] Release all info      [  ] Check here and initial the line next to each item to release ALL health information INCLUDING  _____ Care and treatment for drug and / or alcohol abuse  _____ HIV testing, infection status, or AIDS  _____ Genetic Testing    DATES OF SERVICE OR TIME PERIOD TO BE DISCLOSED: _____________  I understand and acknowledge that:  * This Authorization may be revoked at any time by you in writing, except if your health information has already been used or disclosed.  * Your health information that will be used or disclosed as a result of you signing this authorization could be re-disclosed by the recipient. If this occurs, your re-disclosed health information may no longer be protected by State or Federal laws.  * You may refuse to sign this Authorization. Your refusal will not affect your ability to obtain treatment.  * This Authorization becomes effective upon signing and will  on (date) __________.      If no date is indicated, this Authorization will  one (1) year from the signature date.    Name: Jerome Sterling    Signature:  Continuity of care    Date:     8/14/2019       PLEASE FAX REQUESTED RECORDS BACK TO: [phone]

## 2019-11-18 ENCOUNTER — HOSPITAL ENCOUNTER (OUTPATIENT)
Dept: RADIOLOGY | Facility: MEDICAL CENTER | Age: 59
End: 2019-11-18
Attending: NEUROLOGICAL SURGERY
Payer: MEDICARE

## 2019-11-18 DIAGNOSIS — M54.2 CERVICALGIA: ICD-10-CM

## 2019-11-18 PROCEDURE — 72050 X-RAY EXAM NECK SPINE 4/5VWS: CPT

## 2019-11-18 PROCEDURE — 72141 MRI NECK SPINE W/O DYE: CPT

## 2019-12-22 ENCOUNTER — HOSPITAL ENCOUNTER (OUTPATIENT)
Dept: RADIOLOGY | Facility: MEDICAL CENTER | Age: 59
End: 2019-12-22
Attending: NEUROLOGICAL SURGERY
Payer: MEDICARE

## 2019-12-22 DIAGNOSIS — M54.12 BRACHIAL NEURITIS: ICD-10-CM

## 2019-12-22 DIAGNOSIS — M54.2 CERVICALGIA: ICD-10-CM

## 2019-12-22 DIAGNOSIS — G56.00 CARPAL TUNNEL SYNDROME, UNSPECIFIED LATERALITY: ICD-10-CM

## 2019-12-22 PROCEDURE — 72125 CT NECK SPINE W/O DYE: CPT

## 2019-12-22 PROCEDURE — 73221 MRI JOINT UPR EXTREM W/O DYE: CPT | Mod: RT

## 2020-01-22 ENCOUNTER — HOSPITAL ENCOUNTER (OUTPATIENT)
Facility: MEDICAL CENTER | Age: 60
End: 2020-01-22
Attending: INTERNAL MEDICINE
Payer: MEDICARE

## 2020-01-22 ENCOUNTER — HOSPITAL ENCOUNTER (OUTPATIENT)
Dept: RADIOLOGY | Facility: MEDICAL CENTER | Age: 60
DRG: 497 | End: 2020-01-22
Attending: NEUROLOGICAL SURGERY | Admitting: NEUROLOGICAL SURGERY
Payer: MEDICARE

## 2020-01-22 DIAGNOSIS — Z01.812 PRE-OPERATIVE LABORATORY EXAMINATION: ICD-10-CM

## 2020-01-22 DIAGNOSIS — Z01.811 PRE-OPERATIVE RESPIRATORY EXAMINATION: ICD-10-CM

## 2020-01-22 DIAGNOSIS — Z01.810 PRE-OPERATIVE CARDIOVASCULAR EXAMINATION: ICD-10-CM

## 2020-01-22 LAB
ANION GAP SERPL CALC-SCNC: 8 MMOL/L (ref 0–11.9)
APTT PPP: 28.8 SEC (ref 24.7–36)
BASOPHILS # BLD AUTO: 0.4 % (ref 0–1.8)
BASOPHILS # BLD: 0.03 K/UL (ref 0–0.12)
BUN SERPL-MCNC: 14 MG/DL (ref 8–22)
CALCIUM SERPL-MCNC: 10 MG/DL (ref 8.5–10.5)
CHLORIDE SERPL-SCNC: 103 MMOL/L (ref 96–112)
CO2 SERPL-SCNC: 26 MMOL/L (ref 20–33)
CREAT SERPL-MCNC: 1.07 MG/DL (ref 0.5–1.4)
CRP SERPL HS-MCNC: 0.75 MG/DL (ref 0–0.75)
EKG IMPRESSION: NORMAL
EOSINOPHIL # BLD AUTO: 0.09 K/UL (ref 0–0.51)
EOSINOPHIL NFR BLD: 1.1 % (ref 0–6.9)
ERYTHROCYTE [DISTWIDTH] IN BLOOD BY AUTOMATED COUNT: 43.8 FL (ref 35.9–50)
ERYTHROCYTE [SEDIMENTATION RATE] IN BLOOD BY WESTERGREN METHOD: 12 MM/HOUR (ref 0–20)
GLUCOSE SERPL-MCNC: 90 MG/DL (ref 65–99)
HAV IGM SERPL QL IA: NEGATIVE
HBV CORE IGM SER QL: NEGATIVE
HBV SURFACE AG SER QL: NEGATIVE
HCT VFR BLD AUTO: 47.8 % (ref 42–52)
HCV AB SER QL: NEGATIVE
HGB BLD-MCNC: 16 G/DL (ref 14–18)
IMM GRANULOCYTES # BLD AUTO: 0.02 K/UL (ref 0–0.11)
IMM GRANULOCYTES NFR BLD AUTO: 0.3 % (ref 0–0.9)
INR PPP: 0.97 (ref 0.87–1.13)
LYMPHOCYTES # BLD AUTO: 1.62 K/UL (ref 1–4.8)
LYMPHOCYTES NFR BLD: 20.5 % (ref 22–41)
MCH RBC QN AUTO: 31.5 PG (ref 27–33)
MCHC RBC AUTO-ENTMCNC: 33.5 G/DL (ref 33.7–35.3)
MCV RBC AUTO: 94.1 FL (ref 81.4–97.8)
MONOCYTES # BLD AUTO: 0.52 K/UL (ref 0–0.85)
MONOCYTES NFR BLD AUTO: 6.6 % (ref 0–13.4)
NEUTROPHILS # BLD AUTO: 5.64 K/UL (ref 1.82–7.42)
NEUTROPHILS NFR BLD: 71.1 % (ref 44–72)
NRBC # BLD AUTO: 0 K/UL
NRBC BLD-RTO: 0 /100 WBC
PLATELET # BLD AUTO: 207 K/UL (ref 164–446)
PMV BLD AUTO: 9.1 FL (ref 9–12.9)
POTASSIUM SERPL-SCNC: 3.9 MMOL/L (ref 3.6–5.5)
PROTHROMBIN TIME: 13.1 SEC (ref 12–14.6)
RBC # BLD AUTO: 5.08 M/UL (ref 4.7–6.1)
SODIUM SERPL-SCNC: 137 MMOL/L (ref 135–145)
TSH SERPL DL<=0.005 MIU/L-ACNC: 1.89 UIU/ML (ref 0.38–5.33)
WBC # BLD AUTO: 7.9 K/UL (ref 4.8–10.8)

## 2020-01-22 PROCEDURE — 85652 RBC SED RATE AUTOMATED: CPT

## 2020-01-22 PROCEDURE — 85730 THROMBOPLASTIN TIME PARTIAL: CPT

## 2020-01-22 PROCEDURE — 85025 COMPLETE CBC W/AUTO DIFF WBC: CPT

## 2020-01-22 PROCEDURE — 85610 PROTHROMBIN TIME: CPT

## 2020-01-22 PROCEDURE — 71045 X-RAY EXAM CHEST 1 VIEW: CPT

## 2020-01-22 PROCEDURE — 82784 ASSAY IGA/IGD/IGG/IGM EACH: CPT

## 2020-01-22 PROCEDURE — 36415 COLL VENOUS BLD VENIPUNCTURE: CPT

## 2020-01-22 PROCEDURE — 80074 ACUTE HEPATITIS PANEL: CPT

## 2020-01-22 PROCEDURE — 83516 IMMUNOASSAY NONANTIBODY: CPT

## 2020-01-22 PROCEDURE — 80048 BASIC METABOLIC PNL TOTAL CA: CPT

## 2020-01-22 PROCEDURE — 86140 C-REACTIVE PROTEIN: CPT

## 2020-01-22 PROCEDURE — 93010 ELECTROCARDIOGRAM REPORT: CPT | Performed by: INTERNAL MEDICINE

## 2020-01-22 PROCEDURE — 93005 ELECTROCARDIOGRAM TRACING: CPT

## 2020-01-22 PROCEDURE — 84443 ASSAY THYROID STIM HORMONE: CPT

## 2020-01-22 RX ORDER — IBUPROFEN 200 MG
200 TABLET ORAL EVERY 6 HOURS PRN
Status: ON HOLD | COMMUNITY
End: 2020-01-27

## 2020-01-23 LAB
IGA SERPL-MCNC: 209 MG/DL (ref 68–408)
TTG IGA SER IA-ACNC: 1 U/ML (ref 0–3)

## 2020-01-26 ENCOUNTER — ANESTHESIA EVENT (OUTPATIENT)
Dept: SURGERY | Facility: MEDICAL CENTER | Age: 60
DRG: 497 | End: 2020-01-26
Payer: MEDICARE

## 2020-01-26 PROBLEM — Z72.0 TOBACCO ABUSE: Status: ACTIVE | Noted: 2020-01-26

## 2020-01-27 ENCOUNTER — APPOINTMENT (OUTPATIENT)
Dept: RADIOLOGY | Facility: MEDICAL CENTER | Age: 60
DRG: 497 | End: 2020-01-27
Attending: NEUROLOGICAL SURGERY
Payer: MEDICARE

## 2020-01-27 ENCOUNTER — ANESTHESIA (OUTPATIENT)
Dept: SURGERY | Facility: MEDICAL CENTER | Age: 60
DRG: 497 | End: 2020-01-27
Payer: MEDICARE

## 2020-01-27 ENCOUNTER — HOSPITAL ENCOUNTER (INPATIENT)
Facility: MEDICAL CENTER | Age: 60
LOS: 1 days | DRG: 497 | End: 2020-01-27
Attending: NEUROLOGICAL SURGERY | Admitting: NEUROLOGICAL SURGERY
Payer: MEDICARE

## 2020-01-27 VITALS
OXYGEN SATURATION: 96 % | HEIGHT: 76 IN | HEART RATE: 88 BPM | DIASTOLIC BLOOD PRESSURE: 75 MMHG | TEMPERATURE: 99 F | SYSTOLIC BLOOD PRESSURE: 120 MMHG | BODY MASS INDEX: 25.72 KG/M2 | RESPIRATION RATE: 12 BRPM | WEIGHT: 211.2 LBS

## 2020-01-27 DIAGNOSIS — G89.18 POSTOPERATIVE PAIN: ICD-10-CM

## 2020-01-27 PROCEDURE — 700106 HCHG RX REV CODE 271: Performed by: NEUROLOGICAL SURGERY

## 2020-01-27 PROCEDURE — 160029 HCHG SURGERY MINUTES - 1ST 30 MINS LEVEL 4: Performed by: NEUROLOGICAL SURGERY

## 2020-01-27 PROCEDURE — 0PP304Z REMOVAL OF INTERNAL FIXATION DEVICE FROM CERVICAL VERTEBRA, OPEN APPROACH: ICD-10-PCS | Performed by: NEUROLOGICAL SURGERY

## 2020-01-27 PROCEDURE — 700101 HCHG RX REV CODE 250: Performed by: ANESTHESIOLOGY

## 2020-01-27 PROCEDURE — 95861 NEEDLE EMG 2 EXTREMITIES: CPT | Performed by: NEUROLOGICAL SURGERY

## 2020-01-27 PROCEDURE — A6402 STERILE GAUZE <= 16 SQ IN: HCPCS | Performed by: NEUROLOGICAL SURGERY

## 2020-01-27 PROCEDURE — 95925 SOMATOSENSORY TESTING: CPT | Performed by: NEUROLOGICAL SURGERY

## 2020-01-27 PROCEDURE — 160035 HCHG PACU - 1ST 60 MINS PHASE I: Performed by: NEUROLOGICAL SURGERY

## 2020-01-27 PROCEDURE — 700111 HCHG RX REV CODE 636 W/ 250 OVERRIDE (IP): Performed by: ANESTHESIOLOGY

## 2020-01-27 PROCEDURE — 160025 RECOVERY II MINUTES (STATS): Performed by: NEUROLOGICAL SURGERY

## 2020-01-27 PROCEDURE — 4A11X4G MONITORING OF PERIPHERAL NERVOUS ELECTRICAL ACTIVITY, INTRAOPERATIVE, EXTERNAL APPROACH: ICD-10-PCS | Performed by: NEUROLOGICAL SURGERY

## 2020-01-27 PROCEDURE — 95939 C MOTOR EVOKED UPR&LWR LIMBS: CPT | Performed by: NEUROLOGICAL SURGERY

## 2020-01-27 PROCEDURE — 95865 NEEDLE EMG LARYNX: CPT | Performed by: NEUROLOGICAL SURGERY

## 2020-01-27 PROCEDURE — 160048 HCHG OR STATISTICAL LEVEL 1-5: Performed by: NEUROLOGICAL SURGERY

## 2020-01-27 PROCEDURE — 500444 HCHG HEMOSTAT, SURGICEL 2X3: Performed by: NEUROLOGICAL SURGERY

## 2020-01-27 PROCEDURE — 700105 HCHG RX REV CODE 258: Performed by: NEUROLOGICAL SURGERY

## 2020-01-27 PROCEDURE — 160036 HCHG PACU - EA ADDL 30 MINS PHASE I: Performed by: NEUROLOGICAL SURGERY

## 2020-01-27 PROCEDURE — 500367 HCHG DRAIN KIT, HEMOVAC: Performed by: NEUROLOGICAL SURGERY

## 2020-01-27 PROCEDURE — 500864 HCHG NEEDLE, SPINAL 18G: Performed by: NEUROLOGICAL SURGERY

## 2020-01-27 PROCEDURE — 306637 HCHG MISC ORTHO ITEM RC 0274

## 2020-01-27 PROCEDURE — 160046 HCHG PACU - 1ST 60 MINS PHASE II: Performed by: NEUROLOGICAL SURGERY

## 2020-01-27 PROCEDURE — 700111 HCHG RX REV CODE 636 W/ 250 OVERRIDE (IP)

## 2020-01-27 PROCEDURE — 110454 HCHG SHELL REV 250: Performed by: NEUROLOGICAL SURGERY

## 2020-01-27 PROCEDURE — 700102 HCHG RX REV CODE 250 W/ 637 OVERRIDE(OP): Performed by: ANESTHESIOLOGY

## 2020-01-27 PROCEDURE — 160009 HCHG ANES TIME/MIN: Performed by: NEUROLOGICAL SURGERY

## 2020-01-27 PROCEDURE — 700101 HCHG RX REV CODE 250: Performed by: NEUROLOGICAL SURGERY

## 2020-01-27 PROCEDURE — 160041 HCHG SURGERY MINUTES - EA ADDL 1 MIN LEVEL 4: Performed by: NEUROLOGICAL SURGERY

## 2020-01-27 PROCEDURE — 700105 HCHG RX REV CODE 258: Performed by: ANESTHESIOLOGY

## 2020-01-27 PROCEDURE — 95940 IONM IN OPERATNG ROOM 15 MIN: CPT | Performed by: NEUROLOGICAL SURGERY

## 2020-01-27 PROCEDURE — 72040 X-RAY EXAM NECK SPINE 2-3 VW: CPT

## 2020-01-27 PROCEDURE — 501838 HCHG SUTURE GENERAL: Performed by: NEUROLOGICAL SURGERY

## 2020-01-27 PROCEDURE — A9270 NON-COVERED ITEM OR SERVICE: HCPCS | Performed by: ANESTHESIOLOGY

## 2020-01-27 PROCEDURE — 160002 HCHG RECOVERY MINUTES (STAT): Performed by: NEUROLOGICAL SURGERY

## 2020-01-27 RX ORDER — CEFAZOLIN SODIUM 1 G/3ML
INJECTION, POWDER, FOR SOLUTION INTRAMUSCULAR; INTRAVENOUS PRN
Status: DISCONTINUED | OUTPATIENT
Start: 2020-01-27 | End: 2020-01-27 | Stop reason: SURG

## 2020-01-27 RX ORDER — ONDANSETRON 2 MG/ML
INJECTION INTRAMUSCULAR; INTRAVENOUS PRN
Status: DISCONTINUED | OUTPATIENT
Start: 2020-01-27 | End: 2020-01-27 | Stop reason: SURG

## 2020-01-27 RX ORDER — ONDANSETRON 2 MG/ML
4 INJECTION INTRAMUSCULAR; INTRAVENOUS
Status: COMPLETED | OUTPATIENT
Start: 2020-01-27 | End: 2020-01-27

## 2020-01-27 RX ORDER — LIDOCAINE HYDROCHLORIDE 20 MG/ML
INJECTION, SOLUTION EPIDURAL; INFILTRATION; INTRACAUDAL; PERINEURAL PRN
Status: DISCONTINUED | OUTPATIENT
Start: 2020-01-27 | End: 2020-01-27 | Stop reason: SURG

## 2020-01-27 RX ORDER — SUCCINYLCHOLINE/SOD CL,ISO/PF 200MG/10ML
SYRINGE (ML) INTRAVENOUS PRN
Status: DISCONTINUED | OUTPATIENT
Start: 2020-01-27 | End: 2020-01-27 | Stop reason: SURG

## 2020-01-27 RX ORDER — HYDROMORPHONE HYDROCHLORIDE 1 MG/ML
0.2 INJECTION, SOLUTION INTRAMUSCULAR; INTRAVENOUS; SUBCUTANEOUS
Status: DISCONTINUED | OUTPATIENT
Start: 2020-01-27 | End: 2020-01-27 | Stop reason: HOSPADM

## 2020-01-27 RX ORDER — MAGNESIUM SULFATE HEPTAHYDRATE 40 MG/ML
INJECTION, SOLUTION INTRAVENOUS PRN
Status: DISCONTINUED | OUTPATIENT
Start: 2020-01-27 | End: 2020-01-27 | Stop reason: SURG

## 2020-01-27 RX ORDER — LIDOCAINE HYDROCHLORIDE 10 MG/ML
INJECTION, SOLUTION EPIDURAL; INFILTRATION; INTRACAUDAL; PERINEURAL
Status: DISCONTINUED
Start: 2020-01-27 | End: 2020-01-27 | Stop reason: HOSPADM

## 2020-01-27 RX ORDER — OXYCODONE HCL 5 MG/5 ML
5 SOLUTION, ORAL ORAL
Status: COMPLETED | OUTPATIENT
Start: 2020-01-27 | End: 2020-01-27

## 2020-01-27 RX ORDER — GABAPENTIN 300 MG/1
300 CAPSULE ORAL ONCE
Status: COMPLETED | OUTPATIENT
Start: 2020-01-27 | End: 2020-01-27

## 2020-01-27 RX ORDER — SODIUM CHLORIDE, SODIUM LACTATE, POTASSIUM CHLORIDE, CALCIUM CHLORIDE 600; 310; 30; 20 MG/100ML; MG/100ML; MG/100ML; MG/100ML
INJECTION, SOLUTION INTRAVENOUS CONTINUOUS
Status: DISCONTINUED | OUTPATIENT
Start: 2020-01-27 | End: 2020-01-27 | Stop reason: HOSPADM

## 2020-01-27 RX ORDER — HYDROMORPHONE HYDROCHLORIDE 1 MG/ML
0.4 INJECTION, SOLUTION INTRAMUSCULAR; INTRAVENOUS; SUBCUTANEOUS
Status: DISCONTINUED | OUTPATIENT
Start: 2020-01-27 | End: 2020-01-27 | Stop reason: HOSPADM

## 2020-01-27 RX ORDER — HYDROMORPHONE HYDROCHLORIDE 2 MG/1
1 TABLET ORAL EVERY 8 HOURS
Status: DISCONTINUED | OUTPATIENT
Start: 2020-01-27 | End: 2020-01-27 | Stop reason: HOSPADM

## 2020-01-27 RX ORDER — HYDRALAZINE HYDROCHLORIDE 20 MG/ML
5 INJECTION INTRAMUSCULAR; INTRAVENOUS
Status: DISCONTINUED | OUTPATIENT
Start: 2020-01-27 | End: 2020-01-27 | Stop reason: HOSPADM

## 2020-01-27 RX ORDER — OXYCODONE HCL 5 MG/5 ML
10 SOLUTION, ORAL ORAL
Status: COMPLETED | OUTPATIENT
Start: 2020-01-27 | End: 2020-01-27

## 2020-01-27 RX ORDER — LABETALOL HYDROCHLORIDE 5 MG/ML
5 INJECTION, SOLUTION INTRAVENOUS
Status: DISCONTINUED | OUTPATIENT
Start: 2020-01-27 | End: 2020-01-27 | Stop reason: HOSPADM

## 2020-01-27 RX ORDER — KETAMINE HYDROCHLORIDE 50 MG/ML
INJECTION, SOLUTION INTRAMUSCULAR; INTRAVENOUS PRN
Status: DISCONTINUED | OUTPATIENT
Start: 2020-01-27 | End: 2020-01-27 | Stop reason: SURG

## 2020-01-27 RX ORDER — DIPHENHYDRAMINE HYDROCHLORIDE 50 MG/ML
12.5 INJECTION INTRAMUSCULAR; INTRAVENOUS
Status: DISCONTINUED | OUTPATIENT
Start: 2020-01-27 | End: 2020-01-27 | Stop reason: HOSPADM

## 2020-01-27 RX ORDER — HYDROMORPHONE HYDROCHLORIDE 1 MG/ML
0.1 INJECTION, SOLUTION INTRAMUSCULAR; INTRAVENOUS; SUBCUTANEOUS
Status: DISCONTINUED | OUTPATIENT
Start: 2020-01-27 | End: 2020-01-27 | Stop reason: HOSPADM

## 2020-01-27 RX ORDER — SODIUM CHLORIDE, SODIUM LACTATE, POTASSIUM CHLORIDE, AND CALCIUM CHLORIDE .6; .31; .03; .02 G/100ML; G/100ML; G/100ML; G/100ML
IRRIGANT IRRIGATION
Status: DISCONTINUED | OUTPATIENT
Start: 2020-01-27 | End: 2020-01-27 | Stop reason: HOSPADM

## 2020-01-27 RX ORDER — BUPIVACAINE HYDROCHLORIDE AND EPINEPHRINE 5; 5 MG/ML; UG/ML
INJECTION, SOLUTION EPIDURAL; INTRACAUDAL; PERINEURAL
Status: DISCONTINUED | OUTPATIENT
Start: 2020-01-27 | End: 2020-01-27 | Stop reason: HOSPADM

## 2020-01-27 RX ORDER — ACETAMINOPHEN 500 MG
1000 TABLET ORAL ONCE
Status: COMPLETED | OUTPATIENT
Start: 2020-01-27 | End: 2020-01-27

## 2020-01-27 RX ORDER — MIDAZOLAM HYDROCHLORIDE 1 MG/ML
INJECTION INTRAMUSCULAR; INTRAVENOUS PRN
Status: DISCONTINUED | OUTPATIENT
Start: 2020-01-27 | End: 2020-01-27 | Stop reason: SURG

## 2020-01-27 RX ORDER — HYDROMORPHONE HYDROCHLORIDE 2 MG/1
1 TABLET ORAL EVERY 8 HOURS
Qty: 12 TAB | Refills: 0 | Status: SHIPPED | OUTPATIENT
Start: 2020-01-27 | End: 2020-02-03

## 2020-01-27 RX ORDER — DEXAMETHASONE SODIUM PHOSPHATE 4 MG/ML
INJECTION, SOLUTION INTRA-ARTICULAR; INTRALESIONAL; INTRAMUSCULAR; INTRAVENOUS; SOFT TISSUE PRN
Status: DISCONTINUED | OUTPATIENT
Start: 2020-01-27 | End: 2020-01-27 | Stop reason: SURG

## 2020-01-27 RX ORDER — HALOPERIDOL 5 MG/ML
1 INJECTION INTRAMUSCULAR
Status: DISCONTINUED | OUTPATIENT
Start: 2020-01-27 | End: 2020-01-27 | Stop reason: HOSPADM

## 2020-01-27 RX ORDER — BACITRACIN 50000 [IU]/1
INJECTION, POWDER, FOR SOLUTION INTRAMUSCULAR
Status: DISCONTINUED | OUTPATIENT
Start: 2020-01-27 | End: 2020-01-27 | Stop reason: HOSPADM

## 2020-01-27 RX ORDER — LORAZEPAM 2 MG/ML
0.5 INJECTION INTRAMUSCULAR
Status: DISCONTINUED | OUTPATIENT
Start: 2020-01-27 | End: 2020-01-27 | Stop reason: HOSPADM

## 2020-01-27 RX ADMIN — Medication 95.8 MG: at 12:47

## 2020-01-27 RX ADMIN — GABAPENTIN 300 MG: 300 CAPSULE ORAL at 11:36

## 2020-01-27 RX ADMIN — DEXAMETHASONE SODIUM PHOSPHATE 10 MG: 4 INJECTION, SOLUTION INTRA-ARTICULAR; INTRALESIONAL; INTRAMUSCULAR; INTRAVENOUS; SOFT TISSUE at 12:54

## 2020-01-27 RX ADMIN — KETAMINE HYDROCHLORIDE 100 MG: 50 INJECTION INTRAMUSCULAR; INTRAVENOUS at 12:54

## 2020-01-27 RX ADMIN — ONDANSETRON 4 MG: 2 INJECTION INTRAMUSCULAR; INTRAVENOUS at 13:45

## 2020-01-27 RX ADMIN — CEFAZOLIN 2 G: 330 INJECTION, POWDER, FOR SOLUTION INTRAMUSCULAR; INTRAVENOUS at 12:54

## 2020-01-27 RX ADMIN — LIDOCAINE HYDROCHLORIDE 100 MG: 20 INJECTION, SOLUTION EPIDURAL; INFILTRATION; INTRACAUDAL at 12:44

## 2020-01-27 RX ADMIN — FENTANYL CITRATE 50 MCG: 0.05 INJECTION, SOLUTION INTRAMUSCULAR; INTRAVENOUS at 14:35

## 2020-01-27 RX ADMIN — FENTANYL CITRATE 50 MCG: 50 INJECTION, SOLUTION INTRAMUSCULAR; INTRAVENOUS at 13:23

## 2020-01-27 RX ADMIN — MIDAZOLAM HYDROCHLORIDE 2 MG: 1 INJECTION, SOLUTION INTRAMUSCULAR; INTRAVENOUS at 12:43

## 2020-01-27 RX ADMIN — SODIUM CHLORIDE, POTASSIUM CHLORIDE, SODIUM LACTATE AND CALCIUM CHLORIDE: 600; 310; 30; 20 INJECTION, SOLUTION INTRAVENOUS at 14:15

## 2020-01-27 RX ADMIN — PROPOFOL 75 MCG/KG/MIN: 10 INJECTION, EMULSION INTRAVENOUS at 12:54

## 2020-01-27 RX ADMIN — SODIUM CHLORIDE, POTASSIUM CHLORIDE, SODIUM LACTATE AND CALCIUM CHLORIDE: 600; 310; 30; 20 INJECTION, SOLUTION INTRAVENOUS at 11:35

## 2020-01-27 RX ADMIN — ACETAMINOPHEN 1000 MG: 500 TABLET ORAL at 11:36

## 2020-01-27 RX ADMIN — SODIUM CHLORIDE, POTASSIUM CHLORIDE, SODIUM LACTATE AND CALCIUM CHLORIDE: 600; 310; 30; 20 INJECTION, SOLUTION INTRAVENOUS at 12:43

## 2020-01-27 RX ADMIN — FENTANYL CITRATE 50 MCG: 50 INJECTION, SOLUTION INTRAMUSCULAR; INTRAVENOUS at 13:10

## 2020-01-27 RX ADMIN — PROPOFOL 200 MG: 10 INJECTION, EMULSION INTRAVENOUS at 12:47

## 2020-01-27 RX ADMIN — FENTANYL CITRATE 50 MCG: 0.05 INJECTION, SOLUTION INTRAMUSCULAR; INTRAVENOUS at 14:20

## 2020-01-27 RX ADMIN — ONDANSETRON 4 MG: 2 INJECTION INTRAMUSCULAR; INTRAVENOUS at 14:17

## 2020-01-27 RX ADMIN — MAGNESIUM SULFATE IN WATER 4 G: 40 INJECTION, SOLUTION INTRAVENOUS at 12:54

## 2020-01-27 RX ADMIN — OXYCODONE HYDROCHLORIDE 10 MG: 5 SOLUTION ORAL at 14:16

## 2020-01-27 ASSESSMENT — PAIN SCALES - GENERAL: PAIN_LEVEL: 3

## 2020-01-27 NOTE — PROGRESS NOTES
Med rec updated and complete  Allergies reviewed  Interviewed pt with wife at bedside with permission from pt.  Pt reports no prescription medications, OTC's, or vitamins.  Pt reports no antibiotics in the last 2 weeks

## 2020-01-27 NOTE — ANESTHESIA PREPROCEDURE EVALUATION
Previous ACDF; here for redo, HWR, and extension to C3-7 ACDF    Relevant Problems   ANESTHESIA   (+) Sleep apnea      Other   (+) Intervertebral cervical disc disorder with myelopathy, cervical region   (+) Tobacco abuse (1ppd x 40 years)       Physical Exam    Airway   Mallampati: III  TM distance: >3 FB  Neck ROM: full       Cardiovascular - normal exam  Rhythm: regular  Rate: normal  (-) murmur     Dental - normal exam         Pulmonary - normal exam  Breath sounds clear to auscultation     Abdominal    Neurological - normal exam               Anesthesia Plan    ASA 3   ASA physical status 3 criteria: COPD    Plan - general       Airway plan will be ETT  (NIM tube)      Induction: intravenous    Postoperative Plan: Postoperative administration of opioids is intended.    Pertinent diagnostic labs and testing reviewed    Informed Consent:    Anesthetic plan and risks discussed with patient.    Use of blood products discussed with: patient whom consented to blood products.

## 2020-01-27 NOTE — ANESTHESIA POSTPROCEDURE EVALUATION
Patient: Jerome Sterling    Procedure Summary     Date:  01/27/20 Room / Location:  [facility] 07 / SURGERY [facility]    Anesthesia Start:  1243 Anesthesia Stop:  1358    Procedure:  SPINE, CERVICAL, ANTERIOR APPROACH C4-7 PLATE REMOVAL AND FUSION EXPLORATION (N/A Spine Cervical) Diagnosis:  (CERVICAL RADICULOPATHY)    Surgeon:  Олег Yu M.D. Responsible Provider:  Sherie Znuiga M.D.    Anesthesia Type:  general ASA Status:  3          Final Anesthesia Type: general  Last vitals  BP   Blood Pressure: 150/84    Temp   36.8 °C (98.3 °F)    Pulse   Pulse: 83   Resp   15    SpO2   96 %      Anesthesia Post Evaluation    Patient location during evaluation: PACU  Patient participation: complete - patient participated  Level of consciousness: awake and alert  Pain score: 3    Airway patency: patent  Anesthetic complications: no  Cardiovascular status: hemodynamically stable  Respiratory status: acceptable  Hydration status: euvolemic    PONV: none

## 2020-01-27 NOTE — ANESTHESIA TIME REPORT
Anesthesia Start and Stop Event Times     Date Time Event    1/27/2020 1219 Ready for Procedure     1243 Anesthesia Start     1358 Anesthesia Stop        Responsible Staff  01/27/20    Name Role Begin End    Sherie Zuniga M.D. Anesth 1243 1358        Preop Diagnosis (Free Text):  Pre-op Diagnosis     CERVICAL RADICULOPATHY        Preop Diagnosis (Codes):    Post op Diagnosis  Chronic pain      Premium Reason  Non-Premium    Comments:

## 2020-01-27 NOTE — ANESTHESIA PROCEDURE NOTES
Airway  Date/Time: 1/27/2020 12:48 PM  Performed by: Sherie Zuniga M.D.  Authorized by: Sherie Zuniga M.D.     Location:  OR  Urgency:  Elective  Indications for Airway Management:  Anesthesia  Spontaneous Ventilation: absent    Sedation Level:  Deep  Preoxygenated: Yes    Patient Position:  Sniffing  Mask Difficulty Assessment:  0 - not attempted  Final Airway Type:  Endotracheal airway  Final Endotracheal Airway:  ETT and NIM tube  Cuffed: Yes    Technique Used for Successful ETT Placement:  Video laryngoscopy  Insertion Site:  Oral  Blade Type:  Glide  Laryngoscope Blade/Videolaryngoscope Blade Size:  4  ETT Size (mm):  8.0  Measured from:  Teeth  ETT to Teeth (cm):  22  Placement Verified by: auscultation and capnometry    Cormack-Lehane Classification:  Grade I - full view of glottis  Number of Attempts at Approach:  1

## 2020-01-27 NOTE — OP REPORT
DATE OF SERVICE:  01/27/2020    PREOPERATIVE DIAGNOSES:  Prior C4-C7 ACDF with plate and a smoker with a   question of pseudoarthrosis and broken hardware at C4 and C7.    POSTOPERATIVE DIAGNOSES:  Prior C4-C7 ACDF with plate and a smoker with a   question of pseudoarthrosis and broken hardware at C4 and C7.    PRINCIPAL PROCEDURE PERFORMED:  Anterior cervical approach for C4-C7 hardware   removal and plate removal from an anterior approach and fusion exploration   performed as a distinct procedural service.      SURGEON:  Олег Yu MD    ASSISTANT:  Paula Otto PA-C    ANESTHESIA:  The procedure was performed under general anesthesia.      ANESTHESIA:  Sherie Zuniga MD     COMPLICATIONS:  There were no complications.      FINDINGS:  Include evidence of prod right C4 screw and broken right C4 screw   and bilateral C7 broken screws.      DISPOSITION:  Patient will be extubated and brought to the recovery room.      CLINICAL HISTORY:  The patient is a 59-year-old male who presents with   question of pseudoarthrosis and broken screws at C4 and C7  after a prior   C4-C7 ACDF with plate.  The patient continues to smoke.  Surgery was   recommended.  I have recommended an anterior cervical plate removal.  Then, I   recommended Flex-Ex films down the line.  Risks, benefits and options were   discussed.  The patient signed a written informed consent.      DESCRIPTION OF  PROCEDURE:  The patient was brought to the operating room and   placed under general anesthesia.  The neck was prepped and draped in the usual   sterile fashion.  Neuromonitoring signals were obtained.  SSEPs, EMGs, MEPs   and recurrent laryngeal nerve monitoring were obtained as a baseline.  The   neck was prepped and draped in the usual sterile fashion.  A time-out was   performed.  Perioperative IV antibiotics were infused.  The old incision was   reopened.  The platysma was incised.  A sharp subplatysmal dissection was    performed.  Intraoperative fluoroscopy demonstrated the correct level.  The   prod right C4 screw was identified and removed.  The screw was broken.    Bilateral C7 screws were inside the plate, but were broken.  Bilateral C5 and   bilateral C6 screws were removed without any complication.  Bone was seen   growing over the plate at C4 and also at C7.  Once the plate was removed, the   fusion was explored and there did not appear to be any obvious   pseudoarthrosis.  The exploration took an additional 5-10 minutes.  Perfect   hemostasis was achieved.  The wound was closed in anatomic layers and a   sterile dressing was applied.  The patient will be extubated and brought to   the recovery room.       ____________________________________     MD FRANCA MCKEON / MARQUES    DD:  01/27/2020 14:10:28  DT:  01/27/2020 14:25:11    D#:  [id number]  Job#:  [id number]

## 2020-01-27 NOTE — ANESTHESIA QCDR
2019 [facility] Clinical Data Registry (for Quality Improvement)     Postoperative nausea/vomiting risk protocol (Adult = 18 yrs and Pediatric 3-17 yrs)- (430 and 463)  General inhalation anesthetic (NOT TIVA) with PONV risk factors: Yes  Provision of anti-emetic therapy with at least 2 different classes of agents: Yes   Patient DID NOT receive anti-emetic therapy and reason is documented in Medical Record:  N/A    Multimodal Pain Management- (477)  Non-emergent surgery AND patient age >= 18: Yes  Use of Multimodal Pain Management, two or more drugs and/or interventions, NOT including systemic opioids: Yes  Exception: Documented allergy to multiple classes of analgesics: N/A    Smoking Abstinence (404)  Patient is current smoker (cigarette, pipe, e-cig, marijuanna): Yes  Elective Surgery: Yes  Abstinence instructions provided prior to day of surgery: No  Patient abstained from smoking on day of surgery:     Pre-Op Beta-Blocker in Isolated CABG (44)  Isolated CABG AND patient age >= 18: No  Beta-blocker admin within 24 hours of surgical incision:   Exception:of medical reason(s) for not administering beta blocker within 24 hours prior to surgical incision (e.g., not  indicated,other medical reason):     PACU assessment of acute postoperative pain prior to Anesthesia Care End- Applies to Patients Age = 18- (ABG7)  Initial PACU pain score is which of the following: < 7/10  Patient unable to report pain score: N/A    Post-anesthetic transfer of care checklist/protocol to PACU/ICU- (426 and 427)  Upon conclusion of case, patient transferred to which of the following locations: PACU/Non-ICU  Use of transfer checklist/protocol: Yes  Exclusion: Service Performed in Patient Hospital Room (and thus did not require transfer): N/A  Unplanned admission to ICU related to anesthesia service up through end of PACU care- (MD51)  Unplanned admission to ICU (not initially anticipated at anesthesia start time): No

## 2020-01-28 ENCOUNTER — PATIENT OUTREACH (OUTPATIENT)
Dept: HEALTH INFORMATION MANAGEMENT | Facility: OTHER | Age: 60
End: 2020-01-28

## 2020-01-28 NOTE — OR NURSING
Per Dr. Yu's PA, pt does not need a brace at this time. The discharge instructions stating for pt to wear a brace are part of a generic post op template. Dr. Yu's PA states to disregard the order for a brace.

## 2020-01-28 NOTE — DISCHARGE INSTRUCTIONS
ACTIVITY: Rest and take it easy for the first 24 hours.  A responsible adult is recommended to remain with you during that time.  It is normal to feel sleepy.  We encourage you to not do anything that requires balance, judgment or coordination.    MILD FLU-LIKE SYMPTOMS ARE NORMAL. YOU MAY EXPERIENCE GENERALIZED MUSCLE ACHES, THROAT IRRITATION, HEADACHE AND/OR SOME NAUSEA.    FOR 24 HOURS DO NOT:  Drive, operate machinery or run household appliances.  Drink beer or alcoholic beverages.   Make important decisions or sign legal documents.    SPECIAL INSTRUCTIONS:     Discharge instructions discussed with pt:   Continue to wear brace as directed, may remove for showering.   No bending, lifting or twisting.   Daily over-the-counter laxative while on narcotics.   No aspirin, NSAID or blood thinners for 2 weeks.   Ambulate often to prevent DVT   Continue incentive spirometer hourly   Follow up with [facility] in 2 weeks    DIET: To avoid nausea, slowly advance diet as tolerated, avoiding spicy or greasy foods for the first day.  Add more substantial food to your diet according to your physician's instructions.  Babies can be fed formula or breast milk as soon as they are hungry.  INCREASE FLUIDS AND FIBER TO AVOID CONSTIPATION.    SURGICAL DRESSING/BATHING: Follow surgeon's instructinos    FOLLOW-UP APPOINTMENT:  A follow-up appointment should be arranged with your doctor in 1-2 weeks; call to schedule.    You should CALL YOUR PHYSICIAN if you develop:  Fever greater than 101 degrees F.  Pain not relieved by medication, or persistent nausea or vomiting.  Excessive bleeding (blood soaking through dressing) or unexpected drainage from the wound.  Extreme redness or swelling around the incision site, drainage of pus or foul smelling drainage.  Inability to urinate or empty your bladder within 8 hours.  Problems with breathing or chest pain.    You should call 911 if you develop problems with breathing or chest  pain.  If you are unable to contact your doctor or surgical center, you should go to the nearest emergency room or urgent care center.  Physician's telephone #: Dr. Costa [phone]    If any questions arise, call your doctor.  If your doctor is not available, please feel free to call the Surgical Center at [phone].  The Center is open Monday through Friday from 7AM to 7PM.  You can also call the HEALTH HOTLINE open 24 hours/day, 7 days/week and speak to a nurse at [phone], or toll free at [phone].    A registered nurse may call you a few days after your surgery to see how you are doing after your procedure.    MEDICATIONS: Resume taking daily medication.  Take prescribed pain medication with food.  If no medication is prescribed, you may take non-aspirin pain medication if needed.  PAIN MEDICATION CAN BE VERY CONSTIPATING.  Take a stool softener or laxative such as senokot, pericolace, or milk of magnesia if needed.    Prescription given for dilaudid.  Last pain medication given at 2:16 pm.    If your physician has prescribed pain medication that includes Acetaminophen (Tylenol), do not take additional Acetaminophen (Tylenol) while taking the prescribed medication.    Depression / Suicide Risk    As you are discharged from this [facility] facility, it is important to learn how to keep safe from harming yourself.    Recognize the warning signs:  · Abrupt changes in personality, positive or negative- including increase in energy   · Giving away possessions  · Change in eating patterns- significant weight changes-  positive or negative  · Change in sleeping patterns- unable to sleep or sleeping all the time   · Unwillingness or inability to communicate  · Depression  · Unusual sadness, discouragement and loneliness  · Talk of wanting to die  · Neglect of personal appearance   · Rebelliousness- reckless behavior  · Withdrawal from people/activities they love  · Confusion- inability to  concentrate     If you or a loved one observes any of these behaviors or has concerns about self-harm, here's what you can do:  · Talk about it- your feelings and reasons for harming yourself  · Remove any means that you might use to hurt yourself (examples: pills, rope, extension cords, firearm)  · Get professional help from the community (Mental Health, Substance Abuse, psychological counseling)  · Do not be alone:Call your Safe Contact- someone whom you trust who will be there for you.  · Call your local CRISIS HOTLINE 974-2009 or 886-495-4737  · Call your local Children's Mobile Crisis Response Team [place] (663) 534-2469 or www.ebindle  · Call the toll free National Suicide Prevention Hotlines   · National Suicide Prevention Lifeline 946-078-XKUR (6068)  · National Hope Line Network 800-SUICIDE (576-1119)

## 2020-01-28 NOTE — OR NURSING
Report called to RNNichole. All pertinent events, medical information and care plan details reported to receiving RN.  Reviewed hemodynamics, allergies, code status, applicable medications including pain medications given, and pertinent assessment findings including focused neuromuscular and surgical site assessment. Pts surgical site remains CDI. All questions and concerns addressed. Patient remains HDS on RA, AOx4 at this time. Pt tolerating PO liquids. Patient educated on next phase of care. Pt transferred to phase 2 recovery by this RN. Pt assisted in ambulating to recliner with 1-assist. Pt tolerated well. Pt given IS, which he used x5 with best volume of 2200.

## 2020-01-28 NOTE — PROGRESS NOTES
VSS, denies pain and nausea, states he is ready to leave. Sx site unchanged from initial assessment. IV removed, tip intact. Pt ambulated to restroom to void. Has met all discharge criteria.    Discharge instructions reviewed with pt and family. Pt wife given prescription for dilaudid prior to arrival in Phase II. Pt instructed to return to ER for any worsening of symptoms or difficulty breathing.    Pt declined wheelchair escort and discharged via ambulation with all personal belongings after all questions were answered.

## 2020-01-28 NOTE — PROGRESS NOTES
"Pt arrived to Phase II at 1557 after report from Maranda    A/O x 4, ambulated from gurney to chair, denies nausea and does not rate pain when asked but states \"It's OK.\"     Gauze and transparent dressing to R anterior neck CDI.     Prescription for nicotine patch brought to pt by PA. Call light within reach, rounding in place.      "

## 2020-02-26 ENCOUNTER — PATIENT OUTREACH (OUTPATIENT)
Dept: HEALTH INFORMATION MANAGEMENT | Facility: OTHER | Age: 60
End: 2020-02-26

## 2020-03-02 NOTE — PROGRESS NOTES
A 59-year-old male was an elective admission to [facility] from 1/27/2020 to 1/27/2020 to treat Radiculopathy, cervical region. The patient was discharged Home. No additional medical conditions were listed. The patient was not under clinical case management.    The patient was ordered to follow-up with Specialist and PCP. The patient had the following appointments:     1) 2/5/2020 @ 12:00 Primary Care Physician, general/family practice - CONFIRMED AS KEPT     2) 2/11/2020 @ 10:15 Олег Yu , neurosurgery - CONFIRMED AS KEPT  The patient has no future appointments scheduled.     [facility] Worked closely with the patient's family or caregivers to assist the patient. As a result, Patient adhered with Discharge Orders, Patient attended follow up appointments and Patient successfully recovered or avoided further complications.

## 2020-03-10 ENCOUNTER — HOSPITAL ENCOUNTER (OUTPATIENT)
Dept: RADIOLOGY | Facility: MEDICAL CENTER | Age: 60
End: 2020-03-10
Attending: NEUROLOGICAL SURGERY
Payer: MEDICARE

## 2020-03-10 DIAGNOSIS — M54.2 CERVICAL MUSCLE PAIN: ICD-10-CM

## 2020-03-10 DIAGNOSIS — M54.12 CERVICAL RADICULITIS: ICD-10-CM

## 2020-03-10 PROCEDURE — 72040 X-RAY EXAM NECK SPINE 2-3 VW: CPT

## 2020-05-14 ENCOUNTER — PATIENT OUTREACH (OUTPATIENT)
Dept: HEALTH INFORMATION MANAGEMENT | Facility: OTHER | Age: 60
End: 2020-05-14

## 2020-05-14 NOTE — PROGRESS NOTES
Called to schedule AWV, patient stated he did not have time and would call when he was ready for an appointment.

## 2020-10-12 ENCOUNTER — HOSPITAL ENCOUNTER (OUTPATIENT)
Dept: RADIOLOGY | Facility: MEDICAL CENTER | Age: 60
End: 2020-10-12
Attending: NEUROLOGICAL SURGERY
Payer: MEDICARE

## 2020-10-12 DIAGNOSIS — M54.2 CERVICALGIA: ICD-10-CM

## 2020-10-12 PROCEDURE — 72141 MRI NECK SPINE W/O DYE: CPT

## 2020-10-12 PROCEDURE — 72050 X-RAY EXAM NECK SPINE 4/5VWS: CPT

## 2020-11-23 ENCOUNTER — OFFICE VISIT (OUTPATIENT)
Dept: MEDICAL GROUP | Facility: MEDICAL CENTER | Age: 60
End: 2020-11-23
Payer: MEDICARE

## 2020-11-23 VITALS
HEIGHT: 77 IN | HEART RATE: 75 BPM | DIASTOLIC BLOOD PRESSURE: 60 MMHG | SYSTOLIC BLOOD PRESSURE: 110 MMHG | TEMPERATURE: 98.1 F | WEIGHT: 212 LBS | BODY MASS INDEX: 25.03 KG/M2 | OXYGEN SATURATION: 95 %

## 2020-11-23 DIAGNOSIS — M54.41 CHRONIC BILATERAL LOW BACK PAIN WITH BILATERAL SCIATICA: ICD-10-CM

## 2020-11-23 DIAGNOSIS — F32.A ANXIETY AND DEPRESSION: ICD-10-CM

## 2020-11-23 DIAGNOSIS — Z00.00 MEDICARE ANNUAL WELLNESS VISIT, INITIAL: ICD-10-CM

## 2020-11-23 DIAGNOSIS — F17.200 TOBACCO DEPENDENCE: ICD-10-CM

## 2020-11-23 DIAGNOSIS — Z23 NEED FOR VACCINATION: ICD-10-CM

## 2020-11-23 DIAGNOSIS — Z12.5 SCREENING PSA (PROSTATE SPECIFIC ANTIGEN): ICD-10-CM

## 2020-11-23 DIAGNOSIS — R73.03 PREDIABETES: ICD-10-CM

## 2020-11-23 DIAGNOSIS — Q24.8 PERICARDIAL CYST: ICD-10-CM

## 2020-11-23 DIAGNOSIS — F41.9 ANXIETY AND DEPRESSION: ICD-10-CM

## 2020-11-23 DIAGNOSIS — Z78.9 VARICELLA VACCINATION STATUS UNKNOWN: ICD-10-CM

## 2020-11-23 DIAGNOSIS — R73.09 ELEVATED GLUCOSE LEVEL: ICD-10-CM

## 2020-11-23 DIAGNOSIS — R19.7 DIARRHEA, UNSPECIFIED TYPE: ICD-10-CM

## 2020-11-23 DIAGNOSIS — G47.30 SLEEP APNEA, UNSPECIFIED TYPE: ICD-10-CM

## 2020-11-23 DIAGNOSIS — E78.00 HYPERCHOLESTEREMIA: ICD-10-CM

## 2020-11-23 DIAGNOSIS — M79.672 PAIN IN BOTH FEET: ICD-10-CM

## 2020-11-23 DIAGNOSIS — G89.29 CHRONIC BILATERAL LOW BACK PAIN WITH BILATERAL SCIATICA: ICD-10-CM

## 2020-11-23 DIAGNOSIS — E78.2 MIXED HYPERLIPIDEMIA: ICD-10-CM

## 2020-11-23 DIAGNOSIS — M79.671 PAIN IN BOTH FEET: ICD-10-CM

## 2020-11-23 DIAGNOSIS — M50.00 INTERVERTEBRAL CERVICAL DISC DISORDER WITH MYELOPATHY, CERVICAL REGION: ICD-10-CM

## 2020-11-23 DIAGNOSIS — F17.210 NICOTINE DEPENDENCE, CIGARETTES, UNCOMPLICATED: ICD-10-CM

## 2020-11-23 DIAGNOSIS — M54.42 CHRONIC BILATERAL LOW BACK PAIN WITH BILATERAL SCIATICA: ICD-10-CM

## 2020-11-23 PROBLEM — Z72.0 TOBACCO ABUSE: Status: RESOLVED | Noted: 2020-01-26 | Resolved: 2020-11-23

## 2020-11-23 PROBLEM — K62.5 RECTAL BLEEDING: Status: RESOLVED | Noted: 2019-05-24 | Resolved: 2020-11-23

## 2020-11-23 PROCEDURE — G0008 ADMIN INFLUENZA VIRUS VAC: HCPCS | Performed by: PHYSICIAN ASSISTANT

## 2020-11-23 PROCEDURE — G0439 PPPS, SUBSEQ VISIT: HCPCS | Performed by: PHYSICIAN ASSISTANT

## 2020-11-23 PROCEDURE — 90686 IIV4 VACC NO PRSV 0.5 ML IM: CPT | Performed by: PHYSICIAN ASSISTANT

## 2020-11-23 RX ORDER — DULOXETIN HYDROCHLORIDE 30 MG/1
90 CAPSULE, DELAYED RELEASE ORAL DAILY
Qty: 270 CAP | Refills: 1 | Status: SHIPPED | OUTPATIENT
Start: 2020-11-23 | End: 2021-05-31

## 2020-11-23 RX ORDER — DULOXETIN HYDROCHLORIDE 30 MG/1
30 CAPSULE, DELAYED RELEASE ORAL DAILY
COMMUNITY
End: 2020-11-23 | Stop reason: SDUPTHER

## 2020-11-23 RX ORDER — HYDROXYZINE HYDROCHLORIDE 25 MG/1
25 TABLET, FILM COATED ORAL 3 TIMES DAILY PRN
COMMUNITY
End: 2020-11-23

## 2020-11-23 ASSESSMENT — PATIENT HEALTH QUESTIONNAIRE - PHQ9: CLINICAL INTERPRETATION OF PHQ2 SCORE: 0

## 2020-11-23 ASSESSMENT — ENCOUNTER SYMPTOMS: GENERAL WELL-BEING: FAIR

## 2020-11-23 ASSESSMENT — FIBROSIS 4 INDEX: FIB4 SCORE: 1.41

## 2020-11-23 ASSESSMENT — ACTIVITIES OF DAILY LIVING (ADL): BATHING_REQUIRES_ASSISTANCE: 0

## 2020-11-23 NOTE — ASSESSMENT & PLAN NOTE
This is a pleasant 59-year-old male accompanied by his wife, Abiola.  Here today for an annual physical.  Still has diarrhea.  Will have symptoms daily.  Did have a colonoscopy last year.  Unremarkable.  No follow-up with GI.  Still has chronic back pain and bilateral feet pain.  Follows with Dr. Yu's office.  Had hardware removed from his cervical spine last year.  Does have follow-up.  Also has a history of sleep apnea but does not use a CPAP.  Has prediabetes and hypercholesterolemia in the past.  No follow-up labs.  Last labs were done last May.  Recently went through outpatient rehab program for alcoholism.  Is doing well.  Currently on Cymbalta 90 mg daily for anxiety and depression.  Medication is effective.  Like a renewal.

## 2020-11-23 NOTE — PROGRESS NOTES
Chief Complaint   Patient presents with   • Annual Wellness Visit     HPI:  This is a pleasant 59-year-old male accompanied by his wife, Abiola.  Here today for an annual physical.  Still has diarrhea.  Will have symptoms daily.  Did have a colonoscopy last year.  Unremarkable.  No follow-up with GI.  Still has chronic back pain and bilateral feet pain.  Follows with Dr. Yu's office.  Had hardware removed from his cervical spine last year.  Does have follow-up.  Also has a history of sleep apnea but does not use a CPAP.  Has prediabetes and hypercholesterolemia in the past.  No follow-up labs.  Last labs were done last May.  Recently went through outpatient rehab program for alcoholism.  Is doing well.  Currently on Cymbalta 90 mg daily for anxiety and depression.  Medication is effective.  Like a renewal.    Patient Active Problem List    Diagnosis Date Noted   • Pericardial cyst 09/25/2012     Priority: High   • Sleep apnea 09/25/2012     Priority: High   • Hypercholesteremia 09/25/2012     Priority: Medium   • Chronic bilateral low back pain with bilateral sciatica 09/25/2012     Priority: Medium   • Medicare annual wellness visit, initial 11/23/2020   • Nicotine dependence, uncomplicated (Current Smoker) 11/23/2020   • Anxiety and depression 11/23/2020   • Diarrhea 05/24/2019   • Pain in both feet 05/24/2019   • Prediabetes 05/24/2019   • Intervertebral cervical disc disorder with myelopathy, cervical region 06/30/2014       Current Outpatient Medications   Medication Sig Dispense Refill   • DULoxetine (CYMBALTA) 30 MG Cap DR Particles Take 3 Caps by mouth every day. 270 Cap 1     No current facility-administered medications for this visit.         Patient is taking medications as noted in medication list.  Current supplements as per medication list.     Allergies: Patient has no known allergies.    Current social contact/activities: Pt reports fishing, playing his guitar, watching tv.     Is patient current  with immunizations? No, due for FLU, TDAP and SHINGRIX (Shingles). Patient is interested in receiving FLU, TDAP and SHINGRIX (Shingles) today.    He  reports that he has been smoking cigarettes. He has a 40.00 pack-year smoking history. He has quit using smokeless tobacco.  His smokeless tobacco use included chew. He reports previous alcohol use of about 12.6 oz of alcohol per week. He reports current drug use. Drug: Marijuana.  Ready to quit: Not Answered  Counseling given: Not Answered        DPA/Advanced directive: Patient has Advanced Directive, but it is not on file. Instructed to bring in a copy to scan into their chart.    ROS:    Gait: Uses no assistive device   Ostomy: No   Other tubes: No   Amputations: No   Chronic oxygen use No   Last eye exam Pt reports last year   Wears hearing aids: No   : Denies any urinary leakage during the last 6 months      Depression Screening    Little interest or pleasure in doing things?  0 - not at all  Feeling down, depressed, or hopeless? 0 - not at all  Patient Health Questionnaire Score: 0    If depressive symptoms identified deferred to follow up visit unless specifically addressed in assessment and plan.    Interpretation of PHQ-9 Total Score   Score Severity   1-4 No Depression   5-9 Mild Depression   10-14 Moderate Depression   15-19 Moderately Severe Depression   20-27 Severe Depression    Screening for Cognitive Impairment    Three Minute Recall (river, nation, finger)  3/3    Fabricio clock face with all 12 numbers and set the hands to show 10 past 11.  Yes 4/5  If cognitive concerns identified, deferred for follow up unless specifically addressed in assessment and plan.    Fall Risk Assessment    Has the patient had two or more falls in the last year or any fall with injury in the last year?  No  If fall risk identified, deferred for follow up unless specifically addressed in assessment and plan.    Safety Assessment    Throw rugs on floor.  No  Handrails on all  stairs.  Yes  Good lighting in all hallways.  Yes  Difficulty hearing.  No  Patient counseled about all safety risks that were identified.    Functional Assessment ADLs    Are there any barriers preventing you from cooking for yourself or meeting nutritional needs?  No.    Are there any barriers preventing you from driving safely or obtaining transportation?  No.    Are there any barriers preventing you from using a telephone or calling for help?  No.    Are there any barriers preventing you from shopping?  No.    Are there any barriers preventing you from taking care of your own finances?  No.    Are there any barriers preventing you from managing your medications?  No.    Are there any barriers preventing you from showering, bathing or dressing yourself?  No.    Are you currently engaging in any exercise or physical activity?  No.     What is your perception of your health?  Fair.    Health Maintenance Summary                IMM DTaP/Tdap/Td Vaccine Overdue 12/27/1977     IMM ZOSTER VACCINES Overdue 12/27/2008     IMM INFLUENZA Overdue 9/1/2020     COLONOSCOPY Next Due 1/21/2025      Done 1/21/2020 REFERRAL TO GI FOR COLONOSCOPY     Patient has more history with this topic...          Patient Care Team:  Ambrose Rodriguez P.A.-C. as PCP - General (Family Medicine)  Familia Zaidi D.O. as Consulting Physician (Family Medicine)    Social History     Tobacco Use   • Smoking status: Current Every Day Smoker     Packs/day: 1.00     Years: 40.00     Pack years: 40.00     Types: Cigarettes   • Smokeless tobacco: Former User     Types: Chew   Substance Use Topics   • Alcohol use: Not Currently     Alcohol/week: 12.6 oz     Types: 21 Cans of beer per week     Comment: 3 daily   • Drug use: Yes     Types: Marijuana     Comment: smoke marijuana prn pain     Family History   Problem Relation Age of Onset   • Heart Disease Mother    • Other Mother    • Cancer Father         Lung CA     He  has a past medical history of  "Arthritis, Bowel habit changes, Dental disorder, Heart burn, Hepatitis A, Hypercholesterolemia (9/25/2012), Jaundice (1979), Pain, Pericardial cyst (9/25/2012), Psychiatric problem, Snoring, and Unspecified cataract.   Past Surgical History:   Procedure Laterality Date   • CERVICAL DISK AND FUSION ANTERIOR N/A 1/27/2020    Procedure: SPINE, CERVICAL, ANTERIOR APPROACH C4-7 PLATE REMOVAL AND FUSION EXPLORATION;  Surgeon: Олег Yu M.D.;  Location: [facility];  Service: Neurosurgery   • OTHER  01/21/2020    egd   • LUMBAR FUSION ANTERIOR  10/14/2014    Performed by Олег Yu M.D. at [facility]   • LUMBAR LAMINECTOMY DISKECTOMY  10/14/2014    Performed by Олег Yu M.D. at [facility]   • CERVICAL DISK AND FUSION ANTERIOR  6/30/2014    Performed by Олег Yu M.D. at [facility]   • OTHER ORTHOPEDIC SURGERY  1990    left forearm ligaments   • APPENDECTOMY     • LUMBAR LAMINECTOMY DISKECTOMY             Exam:     /60   Pulse 75   Temp 36.7 °C (98.1 °F) (Temporal)   Ht 1.956 m (6' 5\")   Wt 96.2 kg (212 lb)   SpO2 95%  Body mass index is 25.14 kg/m².    Hearing good.    Dentition fair  Alert, oriented in no acute distress.  Eye contact is good, speech goal directed, affect calm    Assessment and Plan. The following treatment and monitoring plan is recommended:    1. Medicare annual wellness visit, initial     2. Screening PSA (prostate specific antigen)  PROSTATE SPECIFIC AG SCREENING   3. Elevated glucose level  HEMOGLOBIN A1C   4. Mixed hyperlipidemia  Lipid Profile   5. Prediabetes  Comp Metabolic Panel   6. Need for vaccination  Influenza Vaccine Quad Injection (PF)   7. Varicella vaccination status unknown  VARICELLA ZOSTER IGG AB   8. Tobacco dependence     9. Nicotine dependence, cigarettes, uncomplicated  REFERRAL TO LUNG CANCER SCREENING PROGRAM   10. Diarrhea, unspecified type     11. Hypercholesteremia     12. Intervertebral " cervical disc disorder with myelopathy, cervical region     13. Sleep apnea, unspecified type     14. Chronic bilateral low back pain with bilateral sciatica     15. Pain in both feet     16. Pericardial cyst     17. Anxiety and depression  DULoxetine (CYMBALTA) 30 MG Cap DR Particles     Flu shot administered.  Order labs to evaluate for some chronic medical conditions.  Advised to contact insurance regarding tetanus pertussis vaccination.  Reordered Cymbalta.  Discussed Shingrix vaccination and ordered varicella status as he is unsure if he contracted chickenpox.    Services suggested: No services needed at this time  Health Care Screening recommendations as per orders if indicated.  Referrals offered: PT/OT/Nutrition counseling/Behavioral Health/Smoking cessation as per orders if indicated.    Discussion today about general wellness and lifestyle habits:    · Prevent falls and reduce trip hazards; Cautioned about securing or removing rugs.  · Have a working fire alarm and carbon monoxide detector;   · Engage in regular physical activity and social activities       Follow-up: Return in about 6 months (around 5/23/2021), or if symptoms worsen or fail to improve.

## 2020-12-01 ENCOUNTER — TELEPHONE (OUTPATIENT)
Dept: HEMATOLOGY ONCOLOGY | Facility: MEDICAL CENTER | Age: 60
End: 2020-12-01

## 2020-12-01 NOTE — TELEPHONE ENCOUNTER
Received referral to lung cancer screening program.  Chart review to assess for lung cancer screening program eligibility.   1. Age 55-77 yrs of age? Yes 59 y.o.  2. 30 pack year hx of smoking, or greater? Yes 1 [id number]= [id number]  3. Current smoker or if quit, has pt quit within last 15 yrs?Yes  Current smoker  4. Any signs or symptoms of lung cancer? None noted  5. Previous history of lung cancer? None noted  6. Chest CT within past 12 mos.? None noted  Patient does meet eligibility criteria. [facility] scheduling notified to schedule the shared decision making visit.

## 2020-12-08 ENCOUNTER — HOSPITAL ENCOUNTER (OUTPATIENT)
Dept: LAB | Facility: MEDICAL CENTER | Age: 60
End: 2020-12-08
Attending: PHYSICIAN ASSISTANT
Payer: MEDICARE

## 2020-12-08 DIAGNOSIS — R73.09 ELEVATED GLUCOSE LEVEL: ICD-10-CM

## 2020-12-08 DIAGNOSIS — R73.03 PREDIABETES: ICD-10-CM

## 2020-12-08 DIAGNOSIS — Z78.9 VARICELLA VACCINATION STATUS UNKNOWN: ICD-10-CM

## 2020-12-08 DIAGNOSIS — Z12.5 SCREENING PSA (PROSTATE SPECIFIC ANTIGEN): ICD-10-CM

## 2020-12-08 DIAGNOSIS — E78.2 MIXED HYPERLIPIDEMIA: ICD-10-CM

## 2020-12-08 LAB
ALBUMIN SERPL BCP-MCNC: 4.3 G/DL (ref 3.2–4.9)
ALBUMIN/GLOB SERPL: 1.5 G/DL
ALP SERPL-CCNC: 110 U/L (ref 30–99)
ALT SERPL-CCNC: 31 U/L (ref 2–50)
ANION GAP SERPL CALC-SCNC: 9 MMOL/L (ref 7–16)
AST SERPL-CCNC: 27 U/L (ref 12–45)
BILIRUB SERPL-MCNC: 0.4 MG/DL (ref 0.1–1.5)
BUN SERPL-MCNC: 13 MG/DL (ref 8–22)
CALCIUM SERPL-MCNC: 9.6 MG/DL (ref 8.4–10.2)
CHLORIDE SERPL-SCNC: 103 MMOL/L (ref 96–112)
CHOLEST SERPL-MCNC: 278 MG/DL (ref 100–199)
CO2 SERPL-SCNC: 29 MMOL/L (ref 20–33)
CREAT SERPL-MCNC: 0.82 MG/DL (ref 0.5–1.4)
EST. AVERAGE GLUCOSE BLD GHB EST-MCNC: 114 MG/DL
FASTING STATUS PATIENT QL REPORTED: NORMAL
GLOBULIN SER CALC-MCNC: 2.9 G/DL (ref 1.9–3.5)
GLUCOSE SERPL-MCNC: 100 MG/DL (ref 65–99)
HBA1C MFR BLD: 5.6 % (ref 0–5.6)
HDLC SERPL-MCNC: 42 MG/DL
LDLC SERPL CALC-MCNC: ABNORMAL MG/DL
POTASSIUM SERPL-SCNC: 4.9 MMOL/L (ref 3.6–5.5)
PROT SERPL-MCNC: 7.2 G/DL (ref 6–8.2)
PSA SERPL-MCNC: 0.72 NG/ML (ref 0–4)
SODIUM SERPL-SCNC: 141 MMOL/L (ref 135–145)
TRIGL SERPL-MCNC: 455 MG/DL (ref 0–149)

## 2020-12-08 PROCEDURE — 80061 LIPID PANEL: CPT

## 2020-12-08 PROCEDURE — 80053 COMPREHEN METABOLIC PANEL: CPT

## 2020-12-08 PROCEDURE — 36415 COLL VENOUS BLD VENIPUNCTURE: CPT

## 2020-12-08 PROCEDURE — 86787 VARICELLA-ZOSTER ANTIBODY: CPT

## 2020-12-08 PROCEDURE — 83036 HEMOGLOBIN GLYCOSYLATED A1C: CPT

## 2020-12-08 PROCEDURE — 84153 ASSAY OF PSA TOTAL: CPT

## 2020-12-09 PROBLEM — E78.2 MIXED HYPERLIPIDEMIA: Status: ACTIVE | Noted: 2020-12-09

## 2020-12-09 LAB — VZV IGG SER IA-ACNC: 1.74

## 2020-12-14 ENCOUNTER — TELEMEDICINE (OUTPATIENT)
Dept: HEMATOLOGY ONCOLOGY | Facility: MEDICAL CENTER | Age: 60
End: 2020-12-14
Payer: MEDICARE

## 2020-12-14 VITALS — HEIGHT: 75 IN | BODY MASS INDEX: 26.11 KG/M2 | WEIGHT: 210 LBS

## 2020-12-14 DIAGNOSIS — F17.210 CIGARETTE SMOKER: ICD-10-CM

## 2020-12-14 PROCEDURE — G0296 VISIT TO DETERM LDCT ELIG: HCPCS | Mod: 95 | Performed by: NURSE PRACTITIONER

## 2020-12-14 ASSESSMENT — ENCOUNTER SYMPTOMS
SPUTUM PRODUCTION: 0
WHEEZING: 1
WEIGHT LOSS: 0
HEMOPTYSIS: 0
COUGH: 1

## 2020-12-14 ASSESSMENT — PATIENT HEALTH QUESTIONNAIRE - PHQ9
8. MOVING OR SPEAKING SO SLOWLY THAT OTHER PEOPLE COULD HAVE NOTICED. OR THE OPPOSITE, BEING SO FIGETY OR RESTLESS THAT YOU HAVE BEEN MOVING AROUND A LOT MORE THAN USUAL: NOT AT ALL
7. TROUBLE CONCENTRATING ON THINGS, SUCH AS READING THE NEWSPAPER OR WATCHING TELEVISION: SEVERAL DAYS
2. FEELING DOWN, DEPRESSED, IRRITABLE, OR HOPELESS: NOT AT ALL
SUM OF ALL RESPONSES TO PHQ QUESTIONS 1-9: 5
3. TROUBLE FALLING OR STAYING ASLEEP OR SLEEPING TOO MUCH: NOT AT ALL
9. THOUGHTS THAT YOU WOULD BE BETTER OFF DEAD, OR OF HURTING YOURSELF: NOT AT ALL
4. FEELING TIRED OR HAVING LITTLE ENERGY: NEARLY EVERY DAY
SUM OF ALL RESPONSES TO PHQ9 QUESTIONS 1 AND 2: 1
1. LITTLE INTEREST OR PLEASURE IN DOING THINGS: SEVERAL DAYS
5. POOR APPETITE OR OVEREATING: NOT AT ALL
6. FEELING BAD ABOUT YOURSELF - OR THAT YOU ARE A FAILURE OR HAVE LET YOURSELF OR YOUR FAMILY DOWN: NOT AL ALL

## 2020-12-14 ASSESSMENT — FIBROSIS 4 INDEX: FIB4 SCORE: 1.43

## 2020-12-14 NOTE — PROGRESS NOTES
"Subjective:      Jerome Sterling is a 59 y.o. male who presents for Lung Cancer Screening Program Prescreen ([facility]/ SCP/ nicotine dependence/ Ambrose Rodriguez/) for lung cancer screening shared decision making visit.           HPI    Patient seen today for initial lung cancer screening visit. Patient referred by his PCP Ambrose Rodriguez PA-C. Visit is conducted as an on demand video visit using Zoom and patient is unaccompanied today.    The patient meets eligibility criteria including age, smoking history (30+ pack years), if former smoker, quit in the last 15 years, and absence of signs or symptoms of lung cancer.    - Age - 59  - Smoking history - Patient has smoked for 50 years at an average of 2 ppd = 100 pack year smoking history.  - Current smoking status - Current smoker and currently attempting to quit. Used to smoke 3 ppd and now down to 1 ppd with e-cigarette smoking. Recently clean from drugs and alcohol, 215 days. Attempting to completely quit smoking as well.   - No symptoms of lung cancer and no previous history of lung cancer       No Known Allergies  Current Outpatient Medications on File Prior to Visit   Medication Sig Dispense Refill   • DULoxetine (CYMBALTA) 30 MG Cap DR Particles Take 3 Caps by mouth every day. 270 Cap 1     No current facility-administered medications on file prior to visit.        Review of Systems   Constitutional: Positive for malaise/fatigue. Negative for weight loss.   Respiratory: Positive for cough and wheezing. Negative for hemoptysis and sputum production.           Objective:     Ht 1.905 m (6' 3\")   Wt 95.3 kg (210 lb)   BMI 26.25 kg/m²      Physical Exam  Constitutional:       General: He is not in acute distress.     Appearance: Normal appearance.   Pulmonary:      Effort: Pulmonary effort is normal.      Comments: Noted dry cough with virtual visit. No apparent distress or SOB with communication.   Neurological:      Mental Status: He is alert and oriented to " person, place, and time.   Psychiatric:         Mood and Affect: Mood normal.         Behavior: Behavior normal.              Assessment/Plan:        1. Cigarette smoker  CT-LUNG CANCER-SCREENING         We conducted a shared decision-making process using a decision aid. We reviewed benefits and harms of screening, including false positives and potential need for additional diagnostic testing, the possibility of over diagnosis, and total radiation exposure.    We discussed the importance of adhering to annual LDCT screening. We also discussed the impact of comorbities on the patient's the ability or willingness to undergo diagnostic procedure(s) and treatment.    Counseling on the importance of maintaining cigarette smoking abstinence if former smoker; or the importance of smoking cessation if current smoker and, if appropriate, furnishing of information about tobacco cessation interventions. I provided patient with smoking cessation materials and resources within [facility] and the community. Patient appreciative of the resources.     Based on our discussion, we have decided to begin annual lung cancer screening starting now.      This evaluation was conducted via Zoom using secure and encrypted videoconferencing technology. The patient was in a private location in the [facility].    The patient's identity was confirmed and verbal consent was obtained for this virtual visit.

## 2020-12-22 ENCOUNTER — HOSPITAL ENCOUNTER (OUTPATIENT)
Dept: RADIOLOGY | Facility: MEDICAL CENTER | Age: 60
End: 2020-12-22
Attending: NURSE PRACTITIONER
Payer: MEDICARE

## 2020-12-22 DIAGNOSIS — F17.210 CIGARETTE SMOKER: ICD-10-CM

## 2020-12-22 PROCEDURE — G0297 LDCT FOR LUNG CA SCREEN: HCPCS

## 2020-12-23 ENCOUNTER — TELEPHONE (OUTPATIENT)
Dept: HEMATOLOGY ONCOLOGY | Facility: MEDICAL CENTER | Age: 60
End: 2020-12-23

## 2020-12-23 NOTE — TELEPHONE ENCOUNTER
Phoned patient with results of LDCT exam performed 12/22/20.  Notified him that the results showed no pulmonary nodules and no concern for lung cancer.  Recommend follow up CT in 12 months. Informed patient of incidental findings of ---    Mild diffuse centrilobular emphysematous change.     No pulmonary nodule or consolidation.     Stable appearance of large right anterior pericardial cyst measuring 8.4 x 6.6 x 6.1 cm. Minimal increase in calcifications within this is compared with 2012.     No thoracic adenopathy or pleural effusion.     with recommendation to follow up with PCP.   Patient agrees to all recommendations. Referring provider Ambrose Rodriguez P.A.-C. notified of results and incidental findings.  Health maintenance updated and patient sent lung cancer screening result letter.

## 2020-12-23 NOTE — TELEPHONE ENCOUNTER
Patient complete lung cancer screening.  There is mild diffuse centrilobular emphysematous changes.  No pulmonary nodule or consolidation noted.  No adenopathy or pleural effusion.  Lung RADS 1 and recommendation for annual screening.  Patient to follow-up with PCP for continued screening.    Incidental finding noted a large right anterior paracardial cyst that measures 8.4 x 6.6 x 6.1 cm in size.  When compared to previous CT in July 2012, reading radiologist stated that this cyst is stable in appearance with minimal increase in calcifications.  Will defer to primary care provider for continued monitoring and management of this cyst.      Ct-lung Cancer-screening    Result Date: 12/22/2020 12/22/2020 8:26 AM HISTORY/REASON FOR EXAM:  Lung cancer screening. Current smoker. 100 pack year smoking history. TECHNIQUE/EXAM DESCRIPTION AND NUMBER OF VIEWS: Lung cancer screening without contrast. Low dose noncontrast helical images were obtained of the chest from the lung apices through the costophrenic sulci utilizing thin collimation and intervals with reconstructed images sent to PACS in axial, coronal and sagittal planes. Low dose optimization technique was utilized for this CT exam including automated exposure control and adjustment of the mA and/or kV according to patient size. COMPARISON: CT thorax 7/14/2012 and 10/2/2011 FINDINGS: Mild diffuse centrilobular emphysematous changes are present. No bullous change is present. No diffuse interstitial fibrotic change is present. No pulmonary nodule or mass is present. No lobar consolidation is present. No pleural effusion is present. The heart is normal in size. There is an unchanged large right-sided lobulated pericardial cyst measuring 8.4 x 6.6 x 6.1 cm. There are scattered rim and septal calcifications within the cyst. The cyst is without significant change in size or configuration compared with 2012. There has been slight increase in the number of calcifications  within the cyst. A small separate area of pericardial effusion is present anteriorly and is unchanged. There is no mediastinal, hilar, or axillary adenopathy identified in this unenhanced exam. The bony thorax appears normal. No adrenal gland enlargement or hydronephrosis visualized.     1.  Mild diffuse centrilobular emphysematous change. 2.  No pulmonary nodule or consolidation. 3.  Stable appearance of large right anterior pericardial cyst measuring 8.4 x 6.6 x 6.1 cm. Minimal increase in calcifications within this is compared with 2012. 4.  No thoracic adenopathy or pleural effusion. Lung RADS: 1 - Negative: No nodules and definitely benign nodules Findings: no lung nodules nodule(s) with specific calcifications: complete, central, popcorn, concentric rings and fat containing nodules Management: Continue annual screening with LDCT in 12 months

## 2020-12-23 NOTE — LETTER
" [address]  [place] [address]  P [phone]  F [phone]         Date: December 23, 2020    Jerome Crews Forman  [address]  [facility] [address]    Re:  Low-dose chest CT performed on 12/22/2020    Medical Record Number: [id number]    Dear Jerome,    We are pleased to let you know that the results of your recent low-dose chest CT (LDCT) examination were negative, or showed no evidence of lung nodule or mass.  The radiologist recommends to continue annual screening with LDCT in 12 months.  In the event that any additional \"incidental\" findings were identified from this exam, we have communicated back to your primary care provider for follow-up.    Here are some other important points you should know:  · Your low-dose Chest CT report has been sent to your referring or primary health care provider and is available to participants in [employer].  As a part of our Lung Cancer Screening program we will remind you and your referring health care provider when your next LDCT screening is due.  · Although low-dose chest CT is very effective at finding lung cancer early, it cannot find all lung cancers. If you develop any new symptoms such as shortness of breath, chest pain, or coughing up blood, please call your doctor.  · Please keep in mind that good health involves quitting smoking (for help, call [facility] Quit Tobacco program at [phone]), an annual physical exam, and continued screening with low-dose chest CT.    Thank you for participating in the Lung Cancer Screening program. If you have any questions about this letter or our program, please call our Nurse at [phone].    Sincerely,  Swati Poon MD, [facility]  Medical Director  [facility] Lung Cancer Screening Program    "

## 2021-01-11 ENCOUNTER — APPOINTMENT (OUTPATIENT)
Dept: MEDICAL GROUP | Facility: MEDICAL CENTER | Age: 61
End: 2021-01-11
Payer: MEDICARE

## 2021-01-11 RX ORDER — HYDROXYZINE PAMOATE 50 MG/1
CAPSULE ORAL
COMMUNITY
Start: 2020-11-18 | End: 2021-06-30

## 2021-01-12 DIAGNOSIS — E78.2 MIXED HYPERLIPIDEMIA: ICD-10-CM

## 2021-01-12 RX ORDER — FENOFIBRATE 145 MG/1
145 TABLET, COATED ORAL DAILY
Qty: 90 TAB | Refills: 1 | Status: SHIPPED | OUTPATIENT
Start: 2021-01-12 | End: 2021-06-30 | Stop reason: SDUPTHER

## 2021-01-19 DIAGNOSIS — N52.2 DRUG-INDUCED ERECTILE DYSFUNCTION: ICD-10-CM

## 2021-01-19 RX ORDER — TADALAFIL 10 MG/1
10 TABLET ORAL
Qty: 30 TAB | Refills: 3 | Status: SHIPPED | OUTPATIENT
Start: 2021-01-19

## 2021-03-15 DIAGNOSIS — Z23 NEED FOR VACCINATION: ICD-10-CM

## 2021-06-16 PROBLEM — J43.9 MILD EMPHYSEMA (HCC): Status: ACTIVE | Noted: 2021-06-16

## 2021-06-23 ENCOUNTER — APPOINTMENT (OUTPATIENT)
Dept: MEDICAL GROUP | Facility: MEDICAL CENTER | Age: 61
End: 2021-06-23
Payer: MEDICARE

## 2021-06-29 ENCOUNTER — TELEPHONE (OUTPATIENT)
Dept: MEDICAL GROUP | Facility: MEDICAL CENTER | Age: 61
End: 2021-06-29

## 2021-06-30 ENCOUNTER — TELEMEDICINE (OUTPATIENT)
Dept: MEDICAL GROUP | Facility: MEDICAL CENTER | Age: 61
End: 2021-06-30
Payer: MEDICARE

## 2021-06-30 ENCOUNTER — PATIENT MESSAGE (OUTPATIENT)
Dept: HEALTH INFORMATION MANAGEMENT | Facility: OTHER | Age: 61
End: 2021-06-30

## 2021-06-30 VITALS — WEIGHT: 207 LBS | HEIGHT: 75 IN | BODY MASS INDEX: 25.74 KG/M2

## 2021-06-30 DIAGNOSIS — G89.29 CHRONIC PAIN OF BOTH SHOULDERS: ICD-10-CM

## 2021-06-30 DIAGNOSIS — E78.2 MIXED HYPERLIPIDEMIA: ICD-10-CM

## 2021-06-30 DIAGNOSIS — F32.A ANXIETY AND DEPRESSION: ICD-10-CM

## 2021-06-30 DIAGNOSIS — J43.9 MILD EMPHYSEMA (HCC): ICD-10-CM

## 2021-06-30 DIAGNOSIS — M50.00 INTERVERTEBRAL CERVICAL DISC DISORDER WITH MYELOPATHY, CERVICAL REGION: ICD-10-CM

## 2021-06-30 DIAGNOSIS — M25.512 CHRONIC PAIN OF BOTH SHOULDERS: ICD-10-CM

## 2021-06-30 DIAGNOSIS — F41.9 ANXIETY AND DEPRESSION: ICD-10-CM

## 2021-06-30 DIAGNOSIS — M25.511 CHRONIC PAIN OF BOTH SHOULDERS: ICD-10-CM

## 2021-06-30 PROBLEM — M54.2 CHRONIC NECK PAIN: Status: RESOLVED | Noted: 2021-06-30 | Resolved: 2021-06-30

## 2021-06-30 PROBLEM — M54.2 CHRONIC NECK PAIN: Status: ACTIVE | Noted: 2021-06-30

## 2021-06-30 PROCEDURE — 99214 OFFICE O/P EST MOD 30 MIN: CPT | Mod: 95 | Performed by: PHYSICIAN ASSISTANT

## 2021-06-30 RX ORDER — TRAZODONE HYDROCHLORIDE 300 MG/1
300 TABLET ORAL
Qty: 90 TABLET | Refills: 2 | Status: SHIPPED | OUTPATIENT
Start: 2021-06-30

## 2021-06-30 RX ORDER — FENOFIBRATE 145 MG/1
145 TABLET, COATED ORAL DAILY
Qty: 90 TABLET | Refills: 2 | Status: SHIPPED | OUTPATIENT
Start: 2021-06-30 | End: 2021-11-28 | Stop reason: SDUPTHER

## 2021-06-30 RX ORDER — TRAMADOL HYDROCHLORIDE 50 MG/1
50 TABLET ORAL EVERY 4 HOURS PRN
Qty: 42 TABLET | Refills: 0 | Status: SHIPPED | OUTPATIENT
Start: 2021-06-30 | End: 2021-07-07

## 2021-06-30 ASSESSMENT — FIBROSIS 4 INDEX: FIB4 SCORE: 1.45

## 2021-06-30 NOTE — ASSESSMENT & PLAN NOTE
Last cholesterol profile showed his triglycerides in the 400s.  I placed him on fenofibrate.  He has not been able to follow-up with labs.

## 2021-06-30 NOTE — ASSESSMENT & PLAN NOTE
Discontinued Cymbalta oh.  Was depressed while taking the medication because he was taking the medication.  Denies that the pain in the shoulders was impacted by stopping the medication.  Denies any exacerbation of anxiety or depression.

## 2021-06-30 NOTE — ASSESSMENT & PLAN NOTE
Noted on CT scan mild emphysema.  Continues to smoke.  Has cut back to 2 packs daily.  Denies any shortness of breath.

## 2021-06-30 NOTE — TELEPHONE ENCOUNTER
Phone Number Called: [phone] (home)       Call outcome: Spoke to patient regarding message below.    Message: pt said he forgot about appt and re soraya to 06/30 THR

## 2021-06-30 NOTE — PROGRESS NOTES
Subjective:   Jerome Sterling is a 60 y.o. male here today for shoulder pain stemming from chronic neck pain, emphysema, anxiety and depression and mixed hyperlipidemia.    This evaluation was conducted via Zoom using secure and encrypted videoconferencing technology. The patient was in a private location in the [facility].    The patient's identity was confirmed and verbal consent was obtained for this virtual visit.      Chronic pain of both shoulders  This is a pleasant 60-year-old male in [place] visiting his mother who recently fractured a hip.  He complains of chronic bilateral shoulder pain.  Symptoms stem from removal of hardware from C4-C7 by Dr. Yu.  He was told if the pain did not improve that he would need another surgery.  He plans to follow-up with Dr. Yu.  Is requesting some pain management medication.  In the past many years ago was on opiates.  Currently taking ibuprofen which is not effective.    Mild emphysema (HCC)  Noted on CT scan mild emphysema.  Continues to smoke.  Has cut back to 2 packs daily.  Denies any shortness of breath.    Anxiety and depression  Discontinued Cymbalta oh.  Was depressed while taking the medication because he was taking the medication.  Denies that the pain in the shoulders was impacted by stopping the medication.  Denies any exacerbation of anxiety or depression.    Mixed hyperlipidemia  Last cholesterol profile showed his triglycerides in the 400s.  I placed him on fenofibrate.  He has not been able to follow-up with labs.       Current medicines (including changes today)  Current Outpatient Medications   Medication Sig Dispense Refill   • fenofibrate (TRICOR) 145 MG Tab Take 1 tablet by mouth every day. 90 tablet 2   • trazodone (DESYREL) 300 MG tablet Take 1 tablet by mouth at bedtime. 90 tablet 2   • traMADol (ULTRAM) 50 MG Tab Take 1 tablet by mouth every four hours as needed for Moderate Pain for up to 7 days. 42 tablet 0   • tadalafil  "(CIALIS) 10 MG tablet Take 1 Tab by mouth 1 time a day as needed for Erectile Dysfunction. 30 Tab 3     No current facility-administered medications for this visit.     He  has a past medical history of Arthritis, Bowel habit changes, Dental disorder, Heart burn, Hepatitis A, Hypercholesterolemia (9/25/2012), Jaundice (1979), Pain, Pericardial cyst (9/25/2012), Psychiatric problem, Snoring, and Unspecified cataract.    Social History and Family History were reviewed and updated.    ROS   No chest pain, no shortness of breath, no abdominal pain and all other systems were reviewed and are negative.       Objective:     Ht 1.905 m (6' 3\")   Wt 93.9 kg (207 lb)  Body mass index is 25.87 kg/m².   Physical Exam:  Constitutional: Alert, no distress.  Skin: Warm, dry, good turgor, no rashes in visible areas.  Eye: Equal, round and reactive, conjunctiva clear, lids normal.  ENMT: Lips without lesions, good dentition, oropharynx clear.  Neck: Trachea midline, no masses.   Lymph: No cervical or supraclavicular lymphadenopathy  Respiratory: Unlabored respiratory effort.  Psych: Alert and oriented x3, normal affect and mood.        Assessment and Plan:   The following treatment plan was discussed    1. Chronic pain of both shoulders  Chronic condition status post hardware removal.  Follow-up with Dr. Yu.  Provided tramadol.  Sent over a 7-day supply to the local pharmacy.   reviewed.  - traMADol (ULTRAM) 50 MG Tab; Take 1 tablet by mouth every four hours as needed for Moderate Pain for up to 7 days.  Dispense: 42 tablet; Refill: 0    2. Intervertebral cervical disc disorder with myelopathy, cervical region  Chronic condition with recent exacerbation status post fusion hardware removal.  Follow-up with Dr. Yu.  Provided tramadol as directed.  - traMADol (ULTRAM) 50 MG Tab; Take 1 tablet by mouth every four hours as needed for Moderate Pain for up to 7 days.  Dispense: 42 tablet; Refill: 0    3. Mild emphysema " (HCC)  Chronic condition.  Stable.  Asymptomatic.  Urged to reduce cigarette use.  Currently 2 packs daily.    4. Anxiety and depression  Chronic condition.  Stable.  Discontinued Cymbalta on his record.    5. Mixed hyperlipidemia  Chronic condition.  Urged to watch fatty food intake.  Renewed fenofibrate.  Needs to follow-up and return to [place] in the next 3 to 6 months to update his labs.  Sent a prescription to a local pharmacy in [place].  Concerned about developing heart disease.  - fenofibrate (TRICOR) 145 MG Tab; Take 1 tablet by mouth every day.  Dispense: 90 tablet; Refill: 2         Followup: Return in about 3 months (around 9/30/2021), or if symptoms worsen or fail to improve.    Please note that this dictation was created using voice recognition software. I have made every reasonable attempt to correct obvious errors, but I expect that there are errors of grammar and possibly content that I did not discover before finalizing the note.

## 2021-06-30 NOTE — ASSESSMENT & PLAN NOTE
This is a pleasant 60-year-old male in [place] visiting his mother who recently fractured a hip.  He complains of chronic bilateral shoulder pain.  Symptoms stem from removal of hardware from C4-C7 by Dr. Yu.  He was told if the pain did not improve that he would need another surgery.  He plans to follow-up with Dr. Yu.  Is requesting some pain management medication.  In the past many years ago was on opiates.  Currently taking ibuprofen which is not effective.

## 2021-09-08 ENCOUNTER — HOSPITAL ENCOUNTER (OUTPATIENT)
Dept: RADIOLOGY | Facility: MEDICAL CENTER | Age: 61
End: 2021-09-08
Attending: NEUROLOGICAL SURGERY
Payer: MEDICARE

## 2021-09-08 DIAGNOSIS — M54.2 CERVICALGIA: ICD-10-CM

## 2021-09-08 PROCEDURE — 72050 X-RAY EXAM NECK SPINE 4/5VWS: CPT

## 2021-09-08 PROCEDURE — 72141 MRI NECK SPINE W/O DYE: CPT | Mod: MH

## 2021-10-28 ENCOUNTER — HOSPITAL ENCOUNTER (OUTPATIENT)
Dept: RADIOLOGY | Facility: MEDICAL CENTER | Age: 61
End: 2021-10-28
Attending: NEUROLOGICAL SURGERY
Payer: MEDICARE

## 2021-10-28 DIAGNOSIS — M25.511 RIGHT SHOULDER PAIN, UNSPECIFIED CHRONICITY: ICD-10-CM

## 2021-10-28 DIAGNOSIS — M54.12 CERVICAL RADICULOPATHY: ICD-10-CM

## 2021-10-28 PROCEDURE — 73221 MRI JOINT UPR EXTREM W/O DYE: CPT | Mod: RT,MH

## 2021-10-28 PROCEDURE — 73030 X-RAY EXAM OF SHOULDER: CPT | Mod: RT

## 2021-10-28 PROCEDURE — 72125 CT NECK SPINE W/O DYE: CPT | Mod: MH

## 2021-11-17 ENCOUNTER — TELEPHONE (OUTPATIENT)
Dept: HEALTH INFORMATION MANAGEMENT | Facility: OTHER | Age: 61
End: 2021-11-17

## 2021-11-17 NOTE — TELEPHONE ENCOUNTER
Called PT to schedule TRUMAN, Pt declined, as he is in [place] taking care of his mother, He will CB when he returns if he decides to schedule. Briefly introduce myself as his , He was unable to note my direct #, as he did not have a pen handy.

## 2022-02-04 ENCOUNTER — OFFICE VISIT (OUTPATIENT)
Dept: MEDICAL GROUP | Facility: MEDICAL CENTER | Age: 62
End: 2022-02-04
Payer: MEDICARE

## 2022-02-04 VITALS
TEMPERATURE: 99.1 F | WEIGHT: 225 LBS | HEART RATE: 79 BPM | SYSTOLIC BLOOD PRESSURE: 110 MMHG | DIASTOLIC BLOOD PRESSURE: 62 MMHG | BODY MASS INDEX: 27.4 KG/M2 | OXYGEN SATURATION: 97 % | HEIGHT: 76 IN

## 2022-02-04 DIAGNOSIS — F41.9 ANXIETY AND DEPRESSION: ICD-10-CM

## 2022-02-04 DIAGNOSIS — N52.2 DRUG-INDUCED ERECTILE DYSFUNCTION: ICD-10-CM

## 2022-02-04 DIAGNOSIS — M54.42 CHRONIC BILATERAL LOW BACK PAIN WITH BILATERAL SCIATICA: ICD-10-CM

## 2022-02-04 DIAGNOSIS — M25.512 CHRONIC PAIN OF BOTH SHOULDERS: ICD-10-CM

## 2022-02-04 DIAGNOSIS — F32.A ANXIETY AND DEPRESSION: ICD-10-CM

## 2022-02-04 DIAGNOSIS — G89.29 CHRONIC BILATERAL LOW BACK PAIN WITH BILATERAL SCIATICA: ICD-10-CM

## 2022-02-04 DIAGNOSIS — Q24.8 PERICARDIAL CYST: ICD-10-CM

## 2022-02-04 DIAGNOSIS — G89.29 CHRONIC PAIN OF BOTH SHOULDERS: ICD-10-CM

## 2022-02-04 DIAGNOSIS — Z00.00 MEDICARE ANNUAL WELLNESS VISIT, SUBSEQUENT: ICD-10-CM

## 2022-02-04 DIAGNOSIS — M54.41 CHRONIC BILATERAL LOW BACK PAIN WITH BILATERAL SCIATICA: ICD-10-CM

## 2022-02-04 DIAGNOSIS — F17.210 NICOTINE DEPENDENCE, CIGARETTES, UNCOMPLICATED: ICD-10-CM

## 2022-02-04 DIAGNOSIS — R73.03 PREDIABETES: ICD-10-CM

## 2022-02-04 DIAGNOSIS — M79.671 PAIN IN BOTH FEET: ICD-10-CM

## 2022-02-04 DIAGNOSIS — R19.7 DIARRHEA, UNSPECIFIED TYPE: ICD-10-CM

## 2022-02-04 DIAGNOSIS — F32.0 CURRENT MILD EPISODE OF MAJOR DEPRESSIVE DISORDER, UNSPECIFIED WHETHER RECURRENT (HCC): ICD-10-CM

## 2022-02-04 DIAGNOSIS — M25.511 CHRONIC PAIN OF BOTH SHOULDERS: ICD-10-CM

## 2022-02-04 DIAGNOSIS — G47.30 SLEEP APNEA, UNSPECIFIED TYPE: ICD-10-CM

## 2022-02-04 DIAGNOSIS — E78.2 MIXED HYPERLIPIDEMIA: ICD-10-CM

## 2022-02-04 DIAGNOSIS — M79.672 PAIN IN BOTH FEET: ICD-10-CM

## 2022-02-04 DIAGNOSIS — M50.00 INTERVERTEBRAL CERVICAL DISC DISORDER WITH MYELOPATHY, CERVICAL REGION: ICD-10-CM

## 2022-02-04 DIAGNOSIS — R00.2 PALPITATIONS: ICD-10-CM

## 2022-02-04 DIAGNOSIS — J43.9 MILD EMPHYSEMA (HCC): ICD-10-CM

## 2022-02-04 PROBLEM — F32.9 MAJOR DEPRESSIVE DISORDER: Status: ACTIVE | Noted: 2022-02-04

## 2022-02-04 PROCEDURE — 8041 PR SCP AHA: Performed by: PHYSICIAN ASSISTANT

## 2022-02-04 PROCEDURE — G0439 PPPS, SUBSEQ VISIT: HCPCS | Performed by: PHYSICIAN ASSISTANT

## 2022-02-04 RX ORDER — FENOFIBRATE 145 MG/1
145 TABLET, COATED ORAL DAILY
Qty: 90 TABLET | Refills: 2 | Status: SHIPPED | OUTPATIENT
Start: 2022-02-04

## 2022-02-04 ASSESSMENT — ENCOUNTER SYMPTOMS: GENERAL WELL-BEING: FAIR

## 2022-02-04 ASSESSMENT — ANXIETY QUESTIONNAIRES
3. WORRYING TOO MUCH ABOUT DIFFERENT THINGS: NEARLY EVERY DAY
GAD7 TOTAL SCORE: 14
IF YOU CHECKED OFF ANY PROBLEMS ON THIS QUESTIONNAIRE, HOW DIFFICULT HAVE THESE PROBLEMS MADE IT FOR YOU TO DO YOUR WORK, TAKE CARE OF THINGS AT HOME, OR GET ALONG WITH OTHER PEOPLE: SOMEWHAT DIFFICULT
4. TROUBLE RELAXING: MORE THAN HALF THE DAYS
1. FEELING NERVOUS, ANXIOUS, OR ON EDGE: NEARLY EVERY DAY
2. NOT BEING ABLE TO STOP OR CONTROL WORRYING: NEARLY EVERY DAY
6. BECOMING EASILY ANNOYED OR IRRITABLE: MORE THAN HALF THE DAYS
5. BEING SO RESTLESS THAT IT IS HARD TO SIT STILL: SEVERAL DAYS
7. FEELING AFRAID AS IF SOMETHING AWFUL MIGHT HAPPEN: NOT AT ALL

## 2022-02-04 ASSESSMENT — PATIENT HEALTH QUESTIONNAIRE - PHQ9
CLINICAL INTERPRETATION OF PHQ2 SCORE: 5
SUM OF ALL RESPONSES TO PHQ QUESTIONS 1-9: 11
5. POOR APPETITE OR OVEREATING: 3 - NEARLY EVERY DAY

## 2022-02-04 ASSESSMENT — ACTIVITIES OF DAILY LIVING (ADL): BATHING_REQUIRES_ASSISTANCE: 0

## 2022-02-04 NOTE — ASSESSMENT & PLAN NOTE
This is a pleasant 61-year-old male accompanied by his wife, Purvi.  Here today for his annual wellness examination.  He is due for his CT lung cancer screening.  Last 1 was over a year ago.  At that time it was noted that he had mild emphysema.  He has no issues with shortness of breath.  Continues to smoke.  Has smoked for 40 years about 2 packs a day.  Still has diarrhea on a daily basis.  Has seen GI in the past.  Chronic history of back and neck pain.  Follows with Dr. Yu.  He also has  pain in both of his feet.  Dealing with worsening anxiety and depression because his mother is currently with him.  He is not interested in going to behavioral health.  History of elevated cholesterol and prediabetes the last A1c was stable.  He is currently on fenofibrate.  He would like a renewal.  His chronic pain in both of his shoulders with the right side being worse than left.  Told that he had a SLAP tear in the right shoulder.  He has history of ED but is currently not taking Cialis.  Dr. Yu recommended an orthopedic specialist at [facility].  He states for the past month he has been dealing with constellation of symptoms which include dizziness and temporal headache and associated nausea which may last for about 1/2-hour.  During this time it was noted on his apple watch that he was going through atrial fibrillation.  It was advised that he go to the hospital but he has not done so.  He has a history of sleep apnea but refuses to wear CPAP.

## 2022-02-04 NOTE — PROGRESS NOTES
Chief Complaint   Patient presents with   • Annual Exam     aha   • Nausea     last night   • Headache     x1 month, temporal   • Blurred Vision     from the headaches       HPI:    This is a pleasant 61-year-old male accompanied by his wife, Purvi.  Here today for his annual wellness examination.  He is due for his CT lung cancer screening.  Last 1 was over a year ago.  At that time it was noted that he had mild emphysema.  He has no issues with shortness of breath.  Continues to smoke.  Has smoked for 40 years about 2 packs a day.  Still has diarrhea on a daily basis.  Has seen GI in the past.  Chronic history of back and neck pain.  Follows with Dr. Yu.  He also has  pain in both of his feet.  Dealing with worsening anxiety and depression because his mother is currently with him.  He is not interested in going to behavioral health.  History of elevated cholesterol and prediabetes the last A1c was stable.  He is currently on fenofibrate.  He would like a renewal.  His chronic pain in both of his shoulders with the right side being worse than left.  Told that he had a SLAP tear in the right shoulder.  He has history of ED but is currently not taking Cialis.  Dr. Yu recommended an [facility] specialist [facility].  He states for the past month he has been dealing with constellation of symptoms which include dizziness and temporal headache and associated nausea which may last for about 1/2-hour.  During this time it was noted on his apple watch that he was going through atrial fibrillation.  It was advised that he go to the hospital but he has not done so.  History of sleep apnea but refuses to wear CPAP.    Patient Active Problem List    Diagnosis Date Noted   • Major depressive disorder 02/04/2022   • Palpitations 02/04/2022   • Chronic pain of both shoulders 06/30/2021   • Mild emphysema (HCC) 06/16/2021   • Drug-induced erectile dysfunction 01/19/2021   • Mixed hyperlipidemia 12/09/2020   • Medicare annual  wellness visit, subsequent 11/23/2020   • Nicotine dependence, uncomplicated (Current Smoker) 11/23/2020   • Anxiety and depression 11/23/2020   • Diarrhea 05/24/2019   • Pain in both feet 05/24/2019   • Prediabetes 05/24/2019   • Intervertebral cervical disc disorder with myelopathy, cervical region 06/30/2014   • Pericardial cyst 09/25/2012   • Sleep apnea 09/25/2012   • Chronic bilateral low back pain with bilateral sciatica 09/25/2012       Current Outpatient Medications   Medication Sig Dispense Refill   • fenofibrate (TRICOR) 145 MG Tab Take 1 Tablet by mouth every day. 90 Tablet 2   • trazodone (DESYREL) 300 MG tablet Take 1 tablet by mouth at bedtime. 90 tablet 2   • tadalafil (CIALIS) 10 MG tablet Take 1 Tab by mouth 1 time a day as needed for Erectile Dysfunction. 30 Tab 3     No current facility-administered medications for this visit.        Patient is taking medications as noted in medication list.  Current supplements as per medication list.     Allergies: Patient has no known allergies.    Current social contact/activities: spend time with wife    Is patient current with immunizations? Yes.    He  reports that he has been smoking cigarettes. He has a 100.00 pack-year smoking history. He has quit using smokeless tobacco.  His smokeless tobacco use included chew. He reports previous alcohol use of about 12.6 oz of alcohol per week. He reports current drug use. Drug: Marijuana.  Ready to quit: No  Counseling given: Yes  Comment: currently attempting to quit      DPA/Advanced directive: Patient does not have an Advanced Directive.  A packet and workshop information was given on Advanced Directives.    ROS:    Gait: Uses no assistive device   Ostomy: No   Other tubes: No   Amputations: No   Chronic oxygen use No   Last eye exam not recently       Screening:    Depression Screening  Little interest or pleasure in doing things?  3 - nearly every day  Feeling down, depressed, or hopeless? 2 - more than half  the days  Trouble falling or staying asleep, or sleeping too much?  0 - not at all  Feeling tired or having little energy?  3 - nearly every day  Poor appetite or overeating?  3 - nearly every day  Feeling bad about yourself - or that you are a failure or have let yourself or your family down? 0 - not at all  Trouble concentrating on things, such as reading the newspaper or watching television? 0 - not at all  Moving or speaking so slowly that other people could have noticed.  Or the opposite - being so fidgety or restless that you have been moving around a lot more than usual?  0 - not at all  Thoughts that you would be better off dead, or of hurting yourself?  0 - not at all  Patient Health Questionnaire Score: 11    If depressive symptoms identified deferred to follow up visit unless specifically addressed in assessment and plan.    Interpretation of PHQ-9 Total Score   Score Severity   1-4 No Depression   5-9 Mild Depression   10-14 Moderate Depression   15-19 Moderately Severe Depression   20-27 Severe Depression      Screening for Cognitive Impairment  Three Minute Recall (captain, dinorah, picture)  2/3    Draw clock face with all 12 numbers and set the hands to show 5 past 8.  Yes    If cognitive concerns identified, deferred for follow up unless specifically addressed in assessment and plan.    Fall Risk Assessment  Has the patient had two or more falls in the last year or any fall with injury in the last year?  No  If fall risk identified, deferred for follow up unless specifically addressed in assessment and plan.    Safety Assessment  Throw rugs on floor.  Yes  Handrails on all stairs.  Yes  Good lighting in all hallways.  Yes  Difficulty hearing.  No  Patient counseled about all safety risks that were identified.    Functional Assessment ADLs  Are there any barriers preventing you from cooking for yourself or meeting nutritional needs?  No.    Are there any barriers preventing you from driving safely or  obtaining transportation?  No.    Are there any barriers preventing you from using a telephone or calling for help?  No.    Are there any barriers preventing you from shopping?  No.    Are there any barriers preventing you from taking care of your own finances?  No.    Are there any barriers preventing you from managing your medications?    No.    Are there any barriers preventing you from showering, bathing or dressing yourself?  No.    Are you currently engaging in any exercise or physical activity?  Yes.     What is your perception of your health?  Fair.    Health Maintenance Summary          Overdue - Annual Pulmonary Function Test / Spirometry (Yearly) Overdue - never done    No completion history exists for this topic.          Overdue - IMM DTaP/Tdap/Td Vaccine (1 - Tdap) Overdue - never done    No completion history exists for this topic.          Overdue - IMM ZOSTER VACCINES (1 of 2) Overdue - never done    No completion history exists for this topic.          Overdue - IMM INFLUENZA (1) Overdue since 9/1/2021 11/23/2020  Imm Admin: Influenza Vaccine Quad Inj (Pf)          Ordered - LUNG CANCER SCREENING (Yearly) Ordered on 2/4/2022 12/22/2020  CT-LUNG CANCER-SCREENING    07/14/2012  CT-CHEST (THORAX) WITH    10/02/2011  CT-CHEST (THORAX) WITH          Postponed - COVID-19 Vaccine (1) Postponed until 6/30/2022    No completion history exists for this topic.          Annual Wellness Visit (Every 366 Days) Next due on 2/5/2023 02/04/2022  Visit Dx: Medicare annual wellness visit, subsequent    11/23/2020  Prob Dx: Medicare annual wellness visit, subsequent    11/23/2020  Visit Dx: Medicare annual wellness visit, initial          IMM PNEUMOCOCCAL VACCINE: 0-64 Years (2 of 2 - PPSV23) Next due on 12/27/2023 06/30/2014  Imm Admin: Pneumococcal polysaccharide vaccine (PPSV-23)          COLORECTAL CANCER SCREENING (COLONOSCOPY - Every 5 Years) Tentatively due on 1/21/2025 01/21/2020  REFERRAL TO  GI FOR COLONOSCOPY    12/15/2011  REFERRAL TO GI FOR COLONOSCOPY          HEPATITIS C SCREENING  Completed    01/22/2020  HEPATITIS PANEL ACUTE(4 COMPONENTS)          IMM HEP B VACCINE (Series Information) Aged Out    No completion history exists for this topic.          IMM MENINGOCOCCAL VACCINE (MCV4) (Series Information) Aged Out    No completion history exists for this topic.                Patient Care Team:  Ambrose Rodriguez P.A.-C. as PCP - General (Family Medicine)  Don Osuna M.D. as PCP - [facility] Paneled  Familia Zaidi D.O. as Consulting Physician (Family Medicine)    Social History     Tobacco Use   • Smoking status: Current Every Day Smoker     Packs/day: 2.00     Years: 50.00     Pack years: 100.00     Types: Cigarettes   • Smokeless tobacco: Former User     Types: Chew   • Tobacco comment: currently attempting to quit   Vaping Use   • Vaping Use: Every day   • Substances: Nicotine   Substance Use Topics   • Alcohol use: Not Currently     Alcohol/week: 12.6 oz     Types: 21 Cans of beer per week     Comment: 14 months sober   • Drug use: Yes     Types: Marijuana     Comment: smoke marijuana prn pain     Family History   Problem Relation Age of Onset   • Heart Disease Mother    • Other Mother    • Cancer Father         Lung CA     He  has a past medical history of Arthritis, Bowel habit changes, Dental disorder, Heart burn, Hepatitis A, Hypercholesterolemia (9/25/2012), Jaundice (1979), Pain, Pericardial cyst (9/25/2012), Psychiatric problem, Snoring, and Unspecified cataract.   Past Surgical History:   Procedure Laterality Date   • CERVICAL DISK AND FUSION ANTERIOR N/A 1/27/2020    Procedure: SPINE, CERVICAL, ANTERIOR APPROACH C4-7 PLATE REMOVAL AND FUSION EXPLORATION;  Surgeon: Олег Yu M.D.;  Location: [facility];  Service: Neurosurgery   • OTHER  01/21/2020    egd   • LUMBAR FUSION ANTERIOR  10/14/2014    Performed by Олег Yu M.D. at [facility]   • LUMBAR  "LAMINECTOMY DISKECTOMY  10/14/2014    Performed by Олег Yu M.D. at SURGERY [facility]   • CERVICAL DISK AND FUSION ANTERIOR  6/30/2014    Performed by Олег Yu M.D. at SURGERY [facility]   • OTHER ORTHOPEDIC SURGERY  1990    left forearm ligaments   • APPENDECTOMY     • LUMBAR LAMINECTOMY DISKECTOMY           Exam:   /62 (BP Location: Right arm, Patient Position: Sitting, BP Cuff Size: Adult)   Pulse 79   Temp 37.3 °C (99.1 °F) (Temporal)   Ht 1.93 m (6' 4\")   Wt 102 kg (225 lb)   SpO2 97%  Body mass index is 27.39 kg/m².    Hearing good.    Dentition fair  Alert, oriented in no acute distress  Eye contact is good, speech goal directed, affect calm  Heart: Regular rate and rhythm.  No murmur.    Assessment and Plan. The following treatment and monitoring plan is recommended:    1. Mild emphysema (HCC)    2. Chronic pain of both shoulders    3. Drug-induced erectile dysfunction    4. Mixed hyperlipidemia  - fenofibrate (TRICOR) 145 MG Tab; Take 1 Tablet by mouth every day.  Dispense: 90 Tablet; Refill: 2    5. Anxiety and depression    6. Nicotine dependence, uncomplicated (Current Smoker)  - CT-LUNG CANCER-SCREENING; Future    7. Medicare annual wellness visit, subsequent    8. Prediabetes    9. Pain in both feet    10. Diarrhea, unspecified type    11. Intervertebral cervical disc disorder with myelopathy, cervical region    12. Chronic bilateral low back pain with bilateral sciatica    13. Sleep apnea, unspecified type    14. Pericardial cyst    15. Current mild episode of major depressive disorder, unspecified whether recurrent (HCC)  - Patient has been identified as having a positive depression screening. Appropriate orders and counseling have been given.    16. Palpitations  - REFERRAL TO CARDIOLOGY     Currently heart is in normal sinus rhythm.  Refer to cardiology for monitoring.  Advised if he has another episode of suggestive atrial fibrillation he must go to the " [facility].  He has no history of hypertension.  I did renew fenofibrate.  Also ordered CT lung cancer screening.    Services suggested: No services needed at this time  Health Care Screening recommendations as per orders if indicated.  Referrals offered: PT/OT/Nutrition counseling/Behavioral Health/Smoking cessation as per orders if indicated.    Discussion today about general wellness and lifestyle habits:    · Prevent falls and reduce trip hazards; Cautioned about securing or removing rugs.  · Have a working fire alarm and carbon monoxide detector;   · Engage in regular physical activity and social activities.     Follow-up: Return in about 6 months (around 8/4/2022), or if symptoms worsen or fail to improve.Annual Health Assessment Questions:    1.  Are you currently engaging in any exercise or physical activity? Yes    2.  How would you describe your mood or emotional well-being today? good    3.  Have you had any falls in the last year? No    4.  Have you noticed any problems with your balance or had difficulty walking? Yes    5.  In the last six months have you experienced any leakage of urine? NO    6. DPA/Advanced Directive: Patient does not have an Advanced Directive.  A packet and workshop information was given on Advanced Directives.

## 2022-02-18 ENCOUNTER — TELEPHONE (OUTPATIENT)
Dept: CARDIOLOGY | Facility: MEDICAL CENTER | Age: 62
End: 2022-02-18
Payer: MEDICARE

## 2022-02-18 NOTE — TELEPHONE ENCOUNTER
Called patient. No records to obtain for new patient appointment with Dr. Reese on 3/11/2022. Confirmed appointment.

## 2022-02-22 DIAGNOSIS — J43.9 MILD EMPHYSEMA (HCC): ICD-10-CM

## 2022-03-11 ENCOUNTER — APPOINTMENT (OUTPATIENT)
Dept: RADIOLOGY | Facility: MEDICAL CENTER | Age: 62
End: 2022-03-11
Attending: PHYSICIAN ASSISTANT
Payer: MEDICARE

## 2022-06-29 ENCOUNTER — PATIENT MESSAGE (OUTPATIENT)
Dept: HEALTH INFORMATION MANAGEMENT | Facility: OTHER | Age: 62
End: 2022-06-29

## 2022-08-05 ENCOUNTER — APPOINTMENT (OUTPATIENT)
Dept: MEDICAL GROUP | Facility: MEDICAL CENTER | Age: 62
End: 2022-08-05
Payer: MEDICARE

## 2022-08-10 ENCOUNTER — TELEPHONE (OUTPATIENT)
Dept: HEALTH INFORMATION MANAGEMENT | Facility: OTHER | Age: 62
End: 2022-08-10

## (undated) DEVICE — GOWN SURGEONS LARGE - (32/CA)

## (undated) DEVICE — GLOVE BIOGEL PI INDICATOR SZ 7.0 SURGICAL PF LF - (50/BX 4BX/CA)

## (undated) DEVICE — SENSOR SPO2 NEO LNCS ADHESIVE (20/BX) SEE USER NOTES

## (undated) DEVICE — SUTURE 2-0 VICRYL PLUS CT-1 - 8 X 18 INCH(12/BX)

## (undated) DEVICE — PROTECTOR ULNA NERVE - (36PR/CA)

## (undated) DEVICE — STERI STRIP COMPOUND BENZOIN - TINCTURE 0.6ML WITH APPLICATOR (40EA/BX)

## (undated) DEVICE — GLOVE, BIOGEL ECLIPSE, SZ 7.0, PF LTX (50/BX)

## (undated) DEVICE — ELECTRODE 850 FOAM ADHESIVE - HYDROGEL RADIOTRNSPRNT (50/PK)

## (undated) DEVICE — SURGIFOAM (12X7) - (12EA/CA)

## (undated) DEVICE — SHEET PEDIATRIC LAPAROTOMY - (10/CA)

## (undated) DEVICE — CHLORAPREP 26 ML APPLICATOR - ORANGE TINT(25/CA)

## (undated) DEVICE — GOWN WARMING STANDARD FLEX - (30/CA)

## (undated) DEVICE — SPONGE GAUZESTER 4 X 4 4PLY - (128PK/CA)

## (undated) DEVICE — TOOL DISSECT MATCH HEAD

## (undated) DEVICE — CATHETER IV 20 GA X 1-1/4 ---SURG.& SDS ONLY--- (50EA/BX)

## (undated) DEVICE — KIT SURGIFLO W/OUT THROMBIN - (6EA/CA)

## (undated) DEVICE — TUBING CLEARLINK DUO-VENT - C-FLO (48EA/CA)

## (undated) DEVICE — SUTURE 3-0 VICRYL PLUS RB-1 - 8 X 18 INCH (12/BX)

## (undated) DEVICE — HEMOSTAT SURG ABSORBABLE - 2 X 3 IN SURGICEL (24EA/CA)

## (undated) DEVICE — LACTATED RINGERS INJ. 500 ML - (24EA/CA)

## (undated) DEVICE — SLEEVE, VASO, THIGH, MED

## (undated) DEVICE — DISSECT TOOL MIDAS REX

## (undated) DEVICE — CANISTER SUCTION 3000ML MECHANICAL FILTER AUTO SHUTOFF MEDI-VAC NONSTERILE LF DISP  (40EA/CA)

## (undated) DEVICE — INTRAOP NEURO IN OR 1:1 PER 15 MIN

## (undated) DEVICE — SODIUM CHL IRRIGATION 0.9% 1000ML (12EA/CA)

## (undated) DEVICE — SUCTION INSTRUMENT YANKAUER BULBOUS TIP W/O VENT (50EA/CA)

## (undated) DEVICE — MIDAS LUBRICATOR DIFFUSER PACK (4EA/CA)

## (undated) DEVICE — DRESSING TRANSPARENT FILM TEGADERM 4 X 4.75" (50EA/BX)"

## (undated) DEVICE — NEEDLE NON SAFETY HYPO 22 GA X 1 1/2 IN (100/BX)

## (undated) DEVICE — SUTURE 4-0 VICRYL PLUS TF - 8 X 18 INCH (12/BX)

## (undated) DEVICE — SPONGE PEANUT - (5/PK 50PK/CA)

## (undated) DEVICE — KIT EVACUATER 3 SPRING PVC LF 1/8 DRAIN SIZE (10EA/CA)"

## (undated) DEVICE — KIT ANESTHESIA W/CIRCUIT & 3/LT BAG W/FILTER (20EA/CA)

## (undated) DEVICE — NEPTUNE 4 PORT MANIFOLD - (20/PK)

## (undated) DEVICE — RESTRAINTS LIMB DISP. - (12/BX 4BX/CA)

## (undated) DEVICE — PACK NEURO - (2EA/CA)

## (undated) DEVICE — TUBING C&T SET FLYING LEADS DRAIN TUBING (10EA/BX)

## (undated) DEVICE — BOVIE BLADE COATED &INSULATED - 25/PK

## (undated) DEVICE — MASK ANESTHESIA ADULT  - (100/CA)

## (undated) DEVICE — ELECTRODE DUAL RETURN W/ CORD - (50/PK)

## (undated) DEVICE — SUTURE GENERAL

## (undated) DEVICE — SET LEADWIRE 5 LEAD BEDSIDE DISPOSABLE ECG (1SET OF 5/EA)

## (undated) DEVICE — SET EXTENSION WITH 2 PORTS (48EA/CA) ***PART #2C8610 IS A SUBSTITUTE*****

## (undated) DEVICE — KIT ROOM DECONTAMINATION

## (undated) DEVICE — CLOSURE SKIN STRIP 1/2 X 4 IN - (STERI STRIP) (50/BX 4BX/CA)

## (undated) DEVICE — LACTATED RINGERS INJ 1000 ML - (14EA/CA 60CA/PF)

## (undated) DEVICE — HEAD HOLDER JUNIOR/ADULT

## (undated) DEVICE — NEEDLE SPINAL NON-SAFETY 18 GA X 3 IN (25EA/BX)

## (undated) DEVICE — GLOVE BIOGEL SZ 8 SURGICAL PF LTX - (50PR/BX 4BX/CA)